# Patient Record
Sex: FEMALE | Race: WHITE | Employment: OTHER | URBAN - METROPOLITAN AREA
[De-identification: names, ages, dates, MRNs, and addresses within clinical notes are randomized per-mention and may not be internally consistent; named-entity substitution may affect disease eponyms.]

---

## 2017-01-31 ENCOUNTER — OFFICE VISIT (OUTPATIENT)
Dept: SURGERY | Age: 72
End: 2017-01-31

## 2017-01-31 VITALS
TEMPERATURE: 96.9 F | BODY MASS INDEX: 43.06 KG/M2 | RESPIRATION RATE: 18 BRPM | HEART RATE: 71 BPM | SYSTOLIC BLOOD PRESSURE: 160 MMHG | DIASTOLIC BLOOD PRESSURE: 78 MMHG | HEIGHT: 62 IN | OXYGEN SATURATION: 94 % | WEIGHT: 234 LBS

## 2017-01-31 DIAGNOSIS — C50.511 MALIGNANT NEOPLASM OF LOWER-OUTER QUADRANT OF RIGHT FEMALE BREAST (HCC): Primary | ICD-10-CM

## 2017-01-31 DIAGNOSIS — J45.20: ICD-10-CM

## 2017-01-31 DIAGNOSIS — Z79.4 TYPE 2 DIABETES MELLITUS WITHOUT COMPLICATION, WITH LONG-TERM CURRENT USE OF INSULIN (HCC): ICD-10-CM

## 2017-01-31 DIAGNOSIS — E11.9 TYPE 2 DIABETES MELLITUS WITHOUT COMPLICATION, WITH LONG-TERM CURRENT USE OF INSULIN (HCC): ICD-10-CM

## 2017-01-31 DIAGNOSIS — I10 HYPERTENSION, ESSENTIAL: ICD-10-CM

## 2017-01-31 RX ORDER — FLUTICASONE PROPIONATE 250 UG/1
POWDER, METERED RESPIRATORY (INHALATION)
COMMUNITY
Start: 2017-01-05

## 2017-01-31 RX ORDER — QUETIAPINE FUMARATE 25 MG/1
TABLET, FILM COATED ORAL
COMMUNITY

## 2017-01-31 NOTE — MR AVS SNAPSHOT
Visit Information Date & Time Provider Department Dept. Phone Encounter #  
 1/31/2017  1:15 PM Jean Guerrero MD [facility] [phone] [id number] Follow-up Instructions Return in about 6 months (around 7/31/2017). Upcoming Health Maintenance Date Due  
 FOOT EXAM Q1 12[birth year]1954 EYE EXAM RETINAL OR DILATED Q1 12/9/1954 DTaP/Tdap/Td series (1 - Tdap) 12/9/1965 FOBT Q 1 YEAR AGE 50-75 12/9/1994 ZOSTER VACCINE AGE 60> 12/9/2004 GLAUCOMA SCREENING Q2Y 12/9/2009 Pneumococcal 65+ High/Highest Risk (1 of 2 - PCV13) 12/9/2009 MEDICARE YEARLY EXAM 12/9/2009 HEMOGLOBIN A1C Q6M 7/18/2010 MICROALBUMIN Q1 1/18/2011 LIPID PANEL Q1 1/18/2011 INFLUENZA AGE 9 TO ADULT 8/1/2016 BREAST CANCER SCRN MAMMOGRAM 6/7/2018 Allergies as of 1/31/2017  Review Complete On: 1/31/2017 By: Jean Guerrero MD  
  
 Severity Noted Reaction Type Reactions Sulfites High 09/27/2016    Cough SULFITES IS IN PLAVIX Current Immunizations  Never Reviewed No immunizations on file. Not reviewed this visit You Were Diagnosed With   
  
 Codes Comments Malignant neoplasm of lower-outer quadrant of right female breast ([address]    -  Primary ICD-10-CM: C50.511 ICD-9-CM: 174.5 Asthma, endogenous, mild intermittent, uncomplicated     QLQ-08-LC: J45.20 ICD-9-CM: 493.10 Hypertension, essential     ICD-10-CM: I10 
ICD-9-CM: 401.9 Type 2 diabetes mellitus without complication, with long-term current use of insulin (HCC)     ICD-10-CM: E11.9, Z79.4 ICD-9-CM: 250.00, V58.67 Vitals BP Pulse Temp Resp Height(growth percentile) Weight(growth percentile) 172/74 (BP 1 Location: Left arm, BP Patient Position: Sitting) 71 96.9 °F (36.1 °C) (Oral) 18 5' 2\" (1.575 m) 234 lb (106.1 kg) SpO2 BMI OB Status Smoking Status 94% 42.8 kg/m2 Postmenopausal Never Smoker BMI and BSA Data Body Mass Index Body Surface Area  
 42.8 kg/m 2 2.15 m 2 Preferred Pharmacy Pharmacy Name Phone 100 Ewelina Hernandez [facility] [phone] Your Updated Medication List  
  
   
This list is accurate as of: 1/31/17  2:35 PM.  Always use your most recent med list.  
  
  
  
  
 albuterol 2.5 mg /3 mL (0.083 %) nebulizer solution Commonly known as:  PROVENTIL VENTOLIN  
by Nebulization route once. AMBIEN 10 mg tablet Generic drug:  zolpidem Take  by mouth nightly as needed for Sleep.  
  
 anastrozole 1 mg tablet Commonly known as:  ARIMIDEX Take 1 Tab by mouth daily. budesonide 180 mcg/actuation Aepb inhaler Commonly known as:  PULMICORT Take  by inhalation. COQ10  100-100 mg-unit Cap Generic drug:  coenzyme q10-vitamin e Take  by mouth. diltiazem hcl 360 mg Tb24 Take  by mouth. * FLONASE 50 mcg/actuation nasal spray Generic drug:  fluticasone  
nightly. * FLOVENT DISKUS 250 mcg/actuation Dsdv Generic drug:  fluticasone JANUVIA 100 mg tablet Generic drug:  SITagliptin Take 100 mg by mouth daily. LANTUS 100 unit/mL injection Generic drug:  insulin glargine  
by SubCUTAneous route nightly. lovastatin 40 mg tablet Commonly known as:  MEVACOR Take 40 mg by mouth nightly. PRANDIN 0.5 mg tablet Generic drug:  repaglinide Take 0.5 mg by mouth Before breakfast, lunch, and dinner. PROPANTHELINE PO Take  by mouth.  
  
 propranolol LA 80 mg SR capsule Commonly known as:  INDERAL LA  
  
 SEROquel 25 mg tablet Generic drug:  QUEtiapine Take  by mouth. SINGULAIR 10 mg tablet Generic drug:  montelukast  
Take 10 mg by mouth daily. valsartan 320 mg tablet Commonly known as:  DIOVAN Take  by mouth daily. VITAMIN D2 50,000 unit capsule Generic drug:  ergocalciferol Take 50,000 Units by mouth. WELLBUTRIN  mg SR tablet Generic drug:  buPROPion SR Take 200 mg by mouth two (2) times a day. ZOLOFT 100 mg tablet Generic drug:  sertraline Take  by mouth daily. * Notice: This list has 2 medication(s) that are the same as other medications prescribed for you. Read the directions carefully, and ask your doctor or other care provider to review them with you. Follow-up Instructions Return in about 6 months (around 7/31/2017). To-Do List   
 06/12/2017 Imaging:  AYESHA 3D CARMELO W MAMMO BI DX INCL CAD Patient Instructions If you have any questions or concerns about today's appointment, the verbal and/or written instructions you were given for follow up care, please call our office at [phone]. [facility] - [place] [phone] office [phone] Introducing [facility] & HEALTH SERVICES! Mary Turner introduces [employer] patient portal. Now you can access parts of your medical record, email your doctor's office, and request medication refills online. 1. In your internet browser, go to [email or web address] 2. Click on the First Time User? Click Here link in the Sign In box. You will see the New Member Sign Up page. 3. Enter your [employer] Access Code exactly as it appears below. You will not need to use this code after youve completed the sign-up process. If you do not sign up before the expiration date, you must request a new code. · [employer] Access Code: [email or web address] Expires: 5/1/2017  2:35 PM 
 
4. Enter the last four digits of your Social Security Number (xxxx) and Date of Birth (mm/dd/yyyy) as indicated and click Submit. You will be taken to the next sign-up page. 5. Create a [employer] ID. This will be your [employer] login ID and cannot be changed, so think of one that is secure and easy to remember. 6. Create a [employer] password. You can change your password at any time. 7. Enter your Password Reset Question and Answer. This can be used at a later time if you forget your password. 8. Enter your e-mail address. You will receive e-mail notification when new information is available in [address] 9. Click Sign Up. You can now view and download portions of your medical record. 10. Click the Download Summary menu link to download a portable copy of your medical information. If you have questions, please visit the Frequently Asked Questions section of the [employer] website. Remember, [employer] is NOT to be used for urgent needs. For medical emergencies, dial 911. Now available from your iPhone and Android! Please provide this summary of care documentation to your next provider. Your primary care clinician is listed as Nakul Sethi. If you have any questions after today's visit, please call [phone].

## 2017-01-31 NOTE — PROGRESS NOTES
Patient is a 66 y.o. female who presents for a follow up breast exam for invasive ductal carcinoma of the right breast with partial mastectomy done on 11/04/2015. Patient denies any new breast changes or areas of concern today. 1. Have you been to the ER, urgent care clinic since your last visit? Hospitalized since your last visit? No    2. Have you seen or consulted any other health care providers outside of the [facility] since your last visit? Include any pap smears or colon screening.  Yes, psychiatrist.

## 2017-01-31 NOTE — PATIENT INSTRUCTIONS
If you have any questions or concerns about today's appointment, the verbal and/or written instructions you were given for follow up care, please call our office at [phone].     [facility] - [address]    [phone] office  [phone]

## 2017-02-01 NOTE — PROGRESS NOTES
Adrianna Anderson comes to the office today for a little over one-year followup from a previous right partial mastectomy for a 1.1 cm invasive ductal carcinoma. The patient did have some lymphovascular invasion but negative sentinel nodes. Her tumor was estrogen and progesterone receptor positive HER-2 negative. She was treated with brachii therapy refused any chemotherapy. She denies any new breast lumps or soreness. She denies any other aches, pains, shortness of breath or headaches. She has not had any edema of her right arm. Allergies   Allergen Reactions    Sulfites Cough     SULFITES IS IN PLAVIX       Current Outpatient Prescriptions   Medication Sig Dispense Refill    QUEtiapine (SEROQUEL) 25 mg tablet Take  by mouth.  FLOVENT DISKUS 250 mcg/actuation dsdv       propranolol LA (INDERAL LA) 80 mg SR capsule       anastrozole (ARIMIDEX) 1 mg tablet Take 1 Tab by mouth daily. 90 Tab 3    ergocalciferol (VITAMIN D2) 50,000 unit capsule Take 50,000 Units by mouth.  sitaGLIPtin (JANUVIA) 100 mg tablet Take 100 mg by mouth daily.  buPROPion SR (WELLBUTRIN SR) 200 mg SR tablet Take 200 mg by mouth two (2) times a day.  sertraline (ZOLOFT) 100 mg tablet Take  by mouth daily.  valsartan (DIOVAN) 320 mg tablet Take  by mouth daily.  diltiazem hcl 360 mg Tb24 Take  by mouth.  coenzyme q10-vitamin e (COQ10 ) 100-100 mg-unit cap Take  by mouth.  PROPANTHELINE BROMIDE (PROPANTHELINE PO) Take  by mouth.  montelukast (SINGULAIR) 10 mg tablet Take 10 mg by mouth daily.  lovastatin (MEVACOR) 40 mg tablet Take 40 mg by mouth nightly.  insulin glargine (LANTUS) 100 unit/mL injection by SubCUTAneous route nightly.  repaglinide (PRANDIN) 0.5 mg tablet Take 0.5 mg by mouth Before breakfast, lunch, and dinner.  albuterol (PROVENTIL VENTOLIN) 2.5 mg /3 mL (0.083 %) nebulizer solution by Nebulization route once.       fluticasone (FLONASE) 50 mcg/actuation nasal spray nightly.  budesonide (PULMICORT) 180 mcg/actuation aepb inhaler Take  by inhalation.  zolpidem (AMBIEN) 10 mg tablet Take  by mouth nightly as needed for Sleep. Past Medical History   Diagnosis Date    Asthma     Depression     Diabetes ([address]     Hypertension     Thromboembolus [facility])        Past Surgical History   Procedure Laterality Date    Hx cholecystectomy      Hx knee replacement Bilateral     Hx cholecystectomy      Hx knee replacement Bilateral     Hx breast lumpectomy Right 11/4/2015     right breast lumpectomy with localization,sentinel lymph node biopsy,possible complete axillary dissection,insertion of charleen spacer  performed by [facility]ad [facility]ilar [facility] at [facility] MAIN OR        Family History   Problem Relation Age of Onset    Alcohol abuse Mother     Hypertension Father     Cancer Father        Social History     Social History    Marital status:      Spouse name: N/A    Number of children: N/A    Years of education: N/A     Occupational History    Not on file. Social History Main Topics    Smoking status: Never Smoker    Smokeless tobacco: Never Used    Alcohol use No    Drug use: No    Sexual activity: Not on file     Other Topics Concern    Not on file     Social History Narrative      The patient's review of systems has not changed. She is continuing to take medication for diabetes and hypertension. She denies any new cardiac, respiratory, gastrointestinal or tension or problems. PHYSICAL EXAM    Visit Vitals    /78 (BP 1 Location: Left arm, BP Patient Position: Sitting)    Pulse 71    Temp 96.9 °F (36.1 °C) (Oral)    Resp 18    Ht 5' 2\" (1.575 m)    Wt 106.1 kg (234 lb)    SpO2 94%    BMI 42.8 kg/m2       Constitutional:  Well-developed, well-nourished, no acute distress. Head:  Head, eyes, ears, nose, throat within normal limits. Skin:  No suspicious moles or rashes. Neck:  No masses or adenopathy.  The airway appears normal. Thyroid is not enlarged and there are no palpable thyroid nodules. Lungs:  Lungs are clear to auscultation and percussion. No respiratory distress. No chest wall tenderness. Heart:  Heart is regular with no extra heart sounds or murmur heard. Breast Exam: The breasts are free of any discrete tenderness or masses. The skin and nipple areas appear normal. Both axillae are negative. She has well-healed scars from previous surgery without any evidence of recurrent disease. Abdomen: The abdomen is soft and nontender without organomegaly or masses. Bowel sounds are active and of normal  pitch. There is no abdominal distention. No hernias are evident. Extremities:  No tenderness of the extremities and no significant swelling. Psych:  Alert and oriented. ASSESSMENT:#11.1 cm invasive ductal carcinoma of the right breast treated with partial mastectomy a little over one year ago 11/4/15. There was lymphovascular invasion but negative sentinel nodes. The tumor was estrogen and progesterone she positive. She was treated with subsequent brachytherapy and is now on her in the next without evidence of recurrent disease. #2 diabetes. #3 hypertension. #4 hypercholesterolemia. RECOMMENDATIONS:we will plan to see the patient back in about 6 months which will be just after her next mammogram.    Naomy Carson MD  Please note: This document has been produced using voice recognition software. Unrecognized errors in transcription may be present.

## 2017-06-14 ENCOUNTER — HOSPITAL ENCOUNTER (OUTPATIENT)
Dept: MAMMOGRAPHY | Age: 72
Discharge: HOME | End: 2017-06-14
Attending: SPECIALIST
Payer: MEDICARE

## 2017-06-14 DIAGNOSIS — C50.511 MALIGNANT NEOPLASM OF LOWER-OUTER QUADRANT OF RIGHT FEMALE BREAST (HCC): ICD-10-CM

## 2017-06-14 PROCEDURE — 77062 BREAST TOMOSYNTHESIS BI: CPT

## 2017-07-11 ENCOUNTER — OFFICE VISIT (OUTPATIENT)
Dept: SURGERY | Age: 72
End: 2017-07-11

## 2017-07-11 VITALS
BODY MASS INDEX: 41.77 KG/M2 | HEART RATE: 65 BPM | OXYGEN SATURATION: 94 % | TEMPERATURE: 99.4 F | SYSTOLIC BLOOD PRESSURE: 111 MMHG | WEIGHT: 227 LBS | DIASTOLIC BLOOD PRESSURE: 51 MMHG | RESPIRATION RATE: 12 BRPM | HEIGHT: 62 IN

## 2017-07-11 DIAGNOSIS — Z79.4 TYPE 2 DIABETES MELLITUS WITHOUT COMPLICATION, WITH LONG-TERM CURRENT USE OF INSULIN (HCC): ICD-10-CM

## 2017-07-11 DIAGNOSIS — I10 HYPERTENSION, ESSENTIAL: ICD-10-CM

## 2017-07-11 DIAGNOSIS — J45.909 ASTHMA, ENDOGENOUS: ICD-10-CM

## 2017-07-11 DIAGNOSIS — E11.9 TYPE 2 DIABETES MELLITUS WITHOUT COMPLICATION, WITH LONG-TERM CURRENT USE OF INSULIN (HCC): ICD-10-CM

## 2017-07-11 DIAGNOSIS — C50.511 MALIGNANT NEOPLASM OF LOWER-OUTER QUADRANT OF RIGHT FEMALE BREAST (HCC): Primary | ICD-10-CM

## 2017-07-11 RX ORDER — HYDROCHLOROTHIAZIDE 25 MG/1
25 TABLET ORAL DAILY
COMMUNITY

## 2017-07-11 NOTE — PROGRESS NOTES
Ms. Polly Antonio is a 66year old female who presents today for a 6 month follow up from 1/31/17 office visit for a ana partial right mastectomy for invasive ductal carcinoma on 11/10/2015. She reports that her Right breast has been sagging more. Denies any other breast changes. 1. Have you been to the ER, urgent care clinic since your last visit? Hospitalized since your last visit? No    2. Have you seen or consulted any other health care providers outside of the [address] [place] [address] since your last visit? Include any pap smears or colon screening.  No    Started Hydrochlorothiazide 25mg qday

## 2017-07-11 NOTE — MR AVS SNAPSHOT
Visit Information Date & Time Provider Department Dept. Phone Encounter #  
 7/11/2017  1:15 PM Yudelka Alonzo MD [facility] [phone] [id number] Follow-up Instructions Return in about 6 months (around 1/11/2018). Upcoming Health Maintenance Date Due  
 FOOT EXAM Q1 12/9/1955 EYE EXAM RETINAL OR DILATED Q1 12/9/1954 DTaP/Tdap/Td series (1 - Tdap) 12/9/1965 FOBT Q 1 YEAR AGE 50-75 12/9/1994 ZOSTER VACCINE AGE 60> 12/9/2004 GLAUCOMA SCREENING Q2Y 12/9/2009 Pneumococcal 65+ High/Highest Risk (1 of 2 - PCV13) 12/9/2009 MEDICARE YEARLY EXAM 12/9/2009 HEMOGLOBIN A1C Q6M 7/18/2010 MICROALBUMIN Q1 1/18/2011 LIPID PANEL Q1 1/18/2011 INFLUENZA AGE 9 TO ADULT 8/1/2017 BREAST CANCER SCRN MAMMOGRAM 6/14/2019 Allergies as of 7/11/2017  Review Complete On: 7/11/2017 By: Yudelka Alonzo MD  
  
 Severity Noted Reaction Type Reactions Sulfites High 09/27/2016    Cough SULFITES IS IN PLAVIX Current Immunizations  Never Reviewed No immunizations on file. Not reviewed this visit You Were Diagnosed With   
  
 Codes Comments Malignant neoplasm of lower-outer quadrant of right female breast ([facility]    -  Primary ICD-10-CM: C50.511 ICD-9-CM: 174.5 Asthma, endogenous     ICD-10-CM: J45.909 ICD-9-CM: 493.10 Hypertension, essential     ICD-10-CM: I10 
ICD-9-CM: 401.9 Type 2 diabetes mellitus without complication, with long-term current use of insulin (HCC)     ICD-10-CM: E11.9, Z79.4 ICD-9-CM: 250.00, V58.67 Vitals BP Pulse Temp Resp Height(growth percentile) Weight(growth percentile) 111/51 (BP 1 Location: Right arm, BP Patient Position: Sitting) 65 99.4 °F (37.4 °C) (Oral) 12 5' 2\" (1.575 m) 227 lb (103 kg) SpO2 PF BMI OB Status Smoking Status 94% 94 L/min 41.52 kg/m2 Postmenopausal Never Smoker BMI and BSA Data Body Mass Index Body Surface Area 41.52 kg/m 2 2.12 m 2 Preferred Pharmacy Pharmacy Name Phone 100 Tracie Rosas [facility] [phone] Your Updated Medication List  
  
   
This list is accurate as of: 7/11/17  2:05 PM.  Always use your most recent med list.  
  
  
  
  
 albuterol 2.5 mg /3 mL (0.083 %) nebulizer solution Commonly known as:  PROVENTIL VENTOLIN  
by Nebulization route once. AMBIEN 10 mg tablet Generic drug:  zolpidem Take  by mouth nightly as needed for Sleep.  
  
 anastrozole 1 mg tablet Commonly known as:  ARIMIDEX Take 1 Tab by mouth daily. budesonide 180 mcg/actuation Aepb inhaler Commonly known as:  PULMICORT Take  by inhalation. COQ10  100-100 mg-unit Cap Generic drug:  coenzyme q10-vitamin e Take  by mouth. dilTIAZem  mg Tb24 tablet Commonly known as:  CARDIZEM LA Take  by mouth. * FLONASE 50 mcg/actuation nasal spray Generic drug:  fluticasone  
nightly. * FLOVENT DISKUS 250 mcg/actuation Dsdv Generic drug:  fluticasone  
  
 hydroCHLOROthiazide 25 mg tablet Commonly known as:  HYDRODIURIL Take 25 mg by mouth daily. JANUVIA 100 mg tablet Generic drug:  SITagliptin Take 100 mg by mouth daily. LANTUS 100 unit/mL injection Generic drug:  insulin glargine 12 Units by SubCUTAneous route nightly. lovastatin 40 mg tablet Commonly known as:  MEVACOR Take 40 mg by mouth nightly. PRANDIN 0.5 mg tablet Generic drug:  repaglinide Take 0.5 mg by mouth Before breakfast, lunch, and dinner. PROPANTHELINE PO Take  by mouth.  
  
 propranolol LA 80 mg SR capsule Commonly known as:  INDERAL LA  
  
 SEROquel 25 mg tablet Generic drug:  QUEtiapine Take  by mouth. SINGULAIR 10 mg tablet Generic drug:  montelukast  
Take 10 mg by mouth daily. valsartan 320 mg tablet Commonly known as:  DIOVAN Take  by mouth daily. VITAMIN D2 50,000 unit capsule Generic drug:  ergocalciferol Take 50,000 Units by mouth. WELLBUTRIN  mg SR tablet Generic drug:  buPROPion SR Take 200 mg by mouth two (2) times a day. ZOLOFT 100 mg tablet Generic drug:  sertraline Take  by mouth daily. * Notice: This list has 2 medication(s) that are the same as other medications prescribed for you. Read the directions carefully, and ask your doctor or other care provider to review them with you. Follow-up Instructions Return in about 6 months (around 1/11/2018). Introducing [facility] & HEALTH SERVICES! Isabel Rjoas introduces [employer] patient portal. Now you can access parts of your medical record, email your doctor's office, and request medication refills online. 1. In your internet browser, go to [email or web address]. [email or web address] 2. Click on the First Time User? Click Here link in the Sign In box. You will see the New Member Sign Up page. 3. Enter your new [employer] company Access Code exactly as it appears below. You will not need to use this code after youve completed the sign-up process. If you do not sign up before the expiration date, you must request a new code. · new [employer] company Access Code: [email or web address] Expires: 9/10/2017  4:07 PM 
 
4. Enter the last four digits of your Social Security Number (xxxx) and Date of Birth (mm/dd/yyyy) as indicated and click Submit. You will be taken to the next sign-up page. 5. Create a new test company ID. This will be your new test company login ID and cannot be changed, so think of one that is secure and easy to remember. 6. Create a new test company password. You can change your password at any time. 7. Enter your Password Reset Question and Answer. This can be used at a later time if you forget your password. 8. Enter your e-mail address. You will receive e-mail notification when new information is available in [address] 9. Click Sign Up.  You can now view and download portions of your medical record. 10. Click the Download Summary menu link to download a portable copy of your medical information. If you have questions, please visit the Frequently Asked Questions section of the [employer] website. Remember, [employer] is NOT to be used for urgent needs. For medical emergencies, dial 911. Now available from your iPhone and Android! Please provide this summary of care documentation to your next provider. Your primary care clinician is listed as [facility]. If you have any questions after today's visit, please call [phone].

## 2017-07-11 NOTE — PROGRESS NOTES
Janis Hou comes to the office today for a 1-1/2 year follow-up from a previous right partial mastectomy for a 1.1 cm invasive ductal carcinoma. She did have some lymphovascular invasion but negative sentinel nodes. The tumor was estrogen and progestin receptor positive but HER-2 negative. Subsequently she was treated with brachii therapy but refused any chemotherapy. She is tolerating Arimidex. In June of this year she underwent a 3D mammogram which was negative. The patient denies any new breast lumps or soreness. She denies any edema of her right arm. She denies any other new aches, pains, shortness of breath or headaches. Allergies   Allergen Reactions    Sulfites Cough     SULFITES IS IN PLAVIX     Past Medical History:   Diagnosis Date    Asthma     Breast cancer ([facility] 2015    Right lumpectomy    Breast cyst     RT - removal    Depression     Diabetes ([facility]     Hypertension     [facility]      Past Surgical History:   Procedure Laterality Date    HX BREAST LUMPECTOMY Right 11/4/2015    right breast lumpectomy with localization,sentinel lymph node biopsy,possible complete axillary dissection,insertion of charleen spacer  performed by Dedra Leija MD at [facility] HX CHOLECYSTECTOMY      HX CHOLECYSTECTOMY      HX KNEE REPLACEMENT Bilateral     HX KNEE REPLACEMENT Bilateral      Social History     Social History    Marital status:      Spouse name: N/A    Number of children: N/A    Years of education: N/A     Social History Main Topics    Smoking status: Never Smoker    Smokeless tobacco: Never Used    Alcohol use No    Drug use: No    Sexual activity: Not Asked     Other Topics Concern    None     Social History Narrative     The patient's review of systems has not changed. She continues to be on medication for hypertension and hypercholesterolemia. She denies any new cardiac, respiratory, gastrointestinal or genitourinary problems.   She had developed some slight edema of her legs but has started taking HCTZ which has alleviated that problem. PHYSICAL EXAM    Visit Vitals    /51 (BP 1 Location: Right arm, BP Patient Position: Sitting)    Pulse 65    Temp 99.4 °F (37.4 °C) (Oral)    Resp 12    Ht 5' 2\" (1.575 m)    Wt 103 kg (227 lb)    SpO2 94%    PF 94 L/min    BMI 41.52 kg/m2        Constitutional:  Well-developed, well-nourished, no acute distress. Head:  Head, eyes, ears, nose, throat within normal limits. Skin:  No suspicious moles or rashes. Neck:  No masses or adenopathy. The airway appears normal. Thyroid is not enlarged and there are no palpable thyroid nodules. Lungs:  Lungs are clear to auscultation and percussion. No respiratory distress. No chest wall tenderness. Heart:  Heart is regular with no extra heart sounds or murmur heard. Breast Exam: The breasts are free of any discrete tenderness or masses  The skin and nipple areas appear normal. Both axillae are negative. Patient's right breast shows a medial scar and about the 3 o'clock position. But also a transverse curvilinear scar in about the 8 o'clock position a few centimeters away from the areola. This is the area of the most recent surgery. A Brielle device had been implanted for brachii therapy and later removed and one can still just feel scarring from this at the edge of the incision laterally. No evidence of any other recurrent masses or skin changes to suggest recurrence. Abdomen: The abdomen is soft and nontender without organomegaly or masses. Bowel sounds are active and of normal pitch. There is no abdominal distention. No hernias are evident. Extremities:  No tenderness of the extremities and no significant swelling. Psych:  Alert and oriented. Assessment: 1.1 cm invasive ductal carcinoma of the right breast treated with partial mastectomy 1-1/2 years ago. [place] nodes were negative.   Tumor was estrogen and progestin receptor positive but HER-2 negative. She was treated with brachii therapy and is now on Arimidex. No evidence of recurrent disease. Recommendations: The patient's encouraged to do self breast examination. I will plan to see her back in about 6 months. The above was dictated with Pieter. There may be unrecognized errors.     Eddie Sam MD

## 2017-08-20 DIAGNOSIS — C50.919 MALIGNANT NEOPLASM OF FEMALE BREAST, UNSPECIFIED LATERALITY, UNSPECIFIED SITE OF BREAST: ICD-10-CM

## 2017-08-24 RX ORDER — ANASTROZOLE 1 MG/1
TABLET ORAL
Qty: 90 TAB | Refills: 2 | Status: SHIPPED | OUTPATIENT
Start: 2017-08-24 | End: 2018-01-23 | Stop reason: SDUPTHER

## 2018-01-23 ENCOUNTER — OFFICE VISIT (OUTPATIENT)
Dept: SURGERY | Age: 73
End: 2018-01-23

## 2018-01-23 VITALS
BODY MASS INDEX: 43.69 KG/M2 | TEMPERATURE: 98.3 F | WEIGHT: 237.4 LBS | HEIGHT: 62 IN | OXYGEN SATURATION: 96 % | RESPIRATION RATE: 18 BRPM | DIASTOLIC BLOOD PRESSURE: 57 MMHG | SYSTOLIC BLOOD PRESSURE: 138 MMHG | HEART RATE: 71 BPM

## 2018-01-23 DIAGNOSIS — Z17.0 MALIGNANT NEOPLASM OF LOWER-OUTER QUADRANT OF RIGHT BREAST OF FEMALE, ESTROGEN RECEPTOR POSITIVE (HCC): Primary | ICD-10-CM

## 2018-01-23 DIAGNOSIS — I10 HYPERTENSION, ESSENTIAL: ICD-10-CM

## 2018-01-23 DIAGNOSIS — E11.9 TYPE 2 DIABETES MELLITUS WITHOUT COMPLICATION, WITH LONG-TERM CURRENT USE OF INSULIN (HCC): ICD-10-CM

## 2018-01-23 DIAGNOSIS — C50.511 MALIGNANT NEOPLASM OF LOWER-OUTER QUADRANT OF RIGHT BREAST OF FEMALE, ESTROGEN RECEPTOR POSITIVE (HCC): Primary | ICD-10-CM

## 2018-01-23 DIAGNOSIS — Z79.4 TYPE 2 DIABETES MELLITUS WITHOUT COMPLICATION, WITH LONG-TERM CURRENT USE OF INSULIN (HCC): ICD-10-CM

## 2018-01-23 PROBLEM — E66.01 OBESITY, MORBID (HCC): Status: ACTIVE | Noted: 2018-01-23

## 2018-01-23 RX ORDER — ANASTROZOLE 1 MG/1
1 TABLET ORAL DAILY
Qty: 90 TAB | Refills: 3 | Status: SHIPPED | OUTPATIENT
Start: 2018-01-23 | End: 2019-04-01 | Stop reason: SDUPTHER

## 2018-01-23 RX ORDER — ANASTROZOLE 1 MG/1
TABLET ORAL
Qty: 90 TAB | Refills: 2 | Status: SHIPPED | OUTPATIENT
Start: 2018-01-23 | End: 2020-09-15 | Stop reason: SDUPTHER

## 2018-01-23 NOTE — PROGRESS NOTES
Marion Otero comes to the office today for her 2 year follow-up. The patient had a previous right breast lumpectomy for a 1.1 cm invasive ductal carcinoma. She did have some lymphovascular invasion but negative sentinel nodes. The tumor was estrogen and progestin receptor positive but HER-2 negative. She underwent brachii therapy for radiation treatment and has been on Arimidex for almost 2 years. The patient has not been aware of any new breast lumps. Now and then she will have a little bit of breast pain. She denies any edema in her arm or any other new aches, pains, shortness of breath or headaches. The patient's last mammogram was about 6 months ago. Allergies   Allergen Reactions    Sulfites Cough     SULFITES IS IN PLAVIX     Past Medical History:   Diagnosis Date    Asthma     Breast cancer ([facility] 2015    Right lumpectomy    Breast cyst     RT - removal    Depression     Diabetes ([facility]     Hypertension     Thromboembolus ([facility]      Past Surgical History:   Procedure Laterality Date    HX BREAST LUMPECTOMY Right 11/4/2015    right breast lumpectomy with localization,sentinel lymph node biopsy,possible complete axillary dissection,insertion of charleen spacer  [facility] [address] [facility] [address] HX CHOLECYSTECTOMY      HX CHOLECYSTECTOMY      HX KNEE REPLACEMENT Bilateral     HX KNEE REPLACEMENT Bilateral      Social History     Social History    Marital status:      Spouse name: N/A    Number of children: N/A    Years of education: N/A     Social History Main Topics    Smoking status: Never Smoker    Smokeless tobacco: Never Used    Alcohol use No    Drug use: No    Sexual activity: Not Asked     Other Topics Concern    None     Social History Narrative     Overall the patient states that her review of systems has not changed. She continues to take multiple medications including medication for diabetes and hypertension.   She denies any new cardiac, respiratory, gastrointestinal or genitourinary problems. PHYSICAL EXAM    Visit Vitals    /57 (BP 1 Location: Left arm, BP Patient Position: Sitting)    Pulse 71    Temp 98.3 °F (36.8 °C) (Oral)    Resp 18    Ht 5' 2\" (1.575 m)    Wt 107.7 kg (237 lb 6.4 oz)    SpO2 96%    BMI 43.42 kg/m2          Constitutional:  Well-developed, well-nourished, no acute distress. Patient has changed her hair color. She is in very good spirits today. Head:  Head, eyes, ears, nose, throat within normal limits. Skin:  No suspicious moles or rashes. Neck:  No masses or adenopathy. The airway appears normal. Thyroid is not enlarged and there are no palpable thyroid nodules. Lungs:  Lungs are clear to auscultation and percussion. No respiratory distress. No chest wall tenderness. Heart:  Heart is regular with no extra heart sounds or murmur heard. Breast Exam: The breasts are free of any discrete tenderness or masses  The skin and nipple areas appear normal. Both axillae are negative. The patient has very well-healed incisions in the right axilla from the previous sentinel node biopsy and in the right breast medially. Abdomen: The abdomen is soft and nontender without organomegaly or masses. Bowel sounds are active and of normal pitch. There is no abdominal distention. No hernias are evident. Extremities:  No tenderness of the extremities and no significant swelling. Psych:  Alert and oriented. Assessment #1: Invasive ductal carcinoma right breast measuring 1.1 cm removed 2 years ago. There was lymphovascular invasion but negative sentinel nodes. The tumor was estrogen and progestin receptor positive but HER-2 negative. No evidence of recurrent disease. Tolerating Arimidex. #2 diabetes. #3 hypertension. #4 asthma    Recommendations: Patient was encouraged to stay on the Arimidex for at least a total of 5 years. She was encouraged to do self breast examinations monthly. I will plan to see her back in about 6 months and she will be due for her mammogram before that visit. The above was dictated with Pieter. There may be unrecognized errors.     Theoplis Aschoff MD

## 2018-01-23 NOTE — PATIENT INSTRUCTIONS
If you have any questions or concerns about today's appointment, the verbal and/or written instructions you were given for follow up care, please call our office at [phone].     [facility] - [address]    [phone] office  [phone] fax

## 2018-01-23 NOTE — MR AVS SNAPSHOT
[facility] 
 
 
 [address] [facility] [address] [address] 
[phone] Patient: Luis Worthy MRN: [id number] :1945 Visit Information Date & Time Provider Department Dept. Phone Encounter #  
 2018  2:45 PM MD Ly [facility] [phone] [id number] Follow-up Instructions Return in about 6 months (around 2018). Your Appointments 2018  1:00 PM  
Follow Up with Aleena Pak MD  
[address]) Appt Note: 6 mth f/up [address] [facility] [address] [facility]  
  
   
 [id number] [place] [address] [address] Upcoming Health Maintenance Date Due  
 FOOT EXAM Q1 1955 EYE EXAM RETINAL OR DILATED Q1 1954 DTaP/Tdap/Td series (1 - Tdap) 1965 FOBT Q 1 YEAR AGE 50-75 1994 ZOSTER VACCINE AGE 60> 10/9/2004 GLAUCOMA SCREENING Q2Y 2009 Pneumococcal 65+ High/Highest Risk (1 of 2 - PCV13) 2009 MEDICARE YEARLY EXAM 2009 HEMOGLOBIN A1C Q6M 2010 MICROALBUMIN Q1 2011 LIPID PANEL Q1 2011 Influenza Age 5 to Adult 2017 BREAST CANCER SCRN MAMMOGRAM 2019 Allergies as of 2018  Review Complete On: 2018 By: Aleena Pak MD  
  
 Severity Noted Reaction Type Reactions Sulfites High 2016    Cough SULFITES IS IN PLAVIX Current Immunizations  Never Reviewed No immunizations on file. Not reviewed this visit You Were Diagnosed With   
  
 Codes Comments Malignant neoplasm of lower-outer quadrant of right breast of female, estrogen receptor positive ([facility]    -  Primary ICD-10-CM: C50.511, Z17.0 ICD-9-CM: 174.5, V86.0 Hypertension, essential     ICD-10-CM: I10 
ICD-9-CM: 401.9  Type 2 diabetes mellitus without complication, with long-term current use of insulin ([facility]     ICD-10-CM: E11.9, Z79.4 ICD-9-CM: 250.00, V58.67 Vitals BP Pulse Temp Resp Height(growth percentile) Weight(growth percentile) 138/57 (BP 1 Location: Left arm, BP Patient Position: Sitting) 71 98.3 °F (36.8 °C) (Oral) 18 5' 2\" (1.575 m) 237 lb 6.4 oz (107.7 kg) SpO2 BMI OB Status Smoking Status 96% 43.42 kg/m2 Postmenopausal Never Smoker BMI and BSA Data Body Mass Index Body Surface Area  
 43.42 kg/m 2 2.17 m 2 Preferred Pharmacy Pharmacy Name Phone [address] [facility] [phone] Your Updated Medication List  
  
   
This list is accurate as of: 1/23/18  4:22 PM.  Always use your most recent med list.  
  
  
  
  
 albuterol 2.5 mg /3 mL (0.083 %) nebulizer solution Commonly known as:  PROVENTIL VENTOLIN  
by Nebulization route once. AMBIEN 10 mg tablet Generic drug:  zolpidem Take  by mouth nightly as needed for Sleep. * anastrozole 1 mg tablet Commonly known as:  ARIMIDEX Take 1 Tab by mouth daily. * anastrozole 1 mg tablet Commonly known as:  ARIMIDEX TAKE 1 TABLET DAILY  
  
 budesonide 180 mcg/actuation Aepb inhaler Commonly known as:  PULMICORT Take  by inhalation. COQ10  100-100 mg-unit Cap Generic drug:  coenzyme q10-vitamin e Take  by mouth. dilTIAZem  mg Tb24 tablet Commonly known as:  CARDIZEM LA Take  by mouth. * FLONASE 50 mcg/actuation nasal spray Generic drug:  fluticasone  
nightly. * FLOVENT DISKUS 250 mcg/actuation Dsdv Generic drug:  fluticasone  
  
 hydroCHLOROthiazide 25 mg tablet Commonly known as:  HYDRODIURIL Take 25 mg by mouth daily. JANUVIA 100 mg tablet Generic drug:  SITagliptin Take 100 mg by mouth daily. LANTUS 100 unit/mL injection Generic drug:  insulin glargine 12 Units by SubCUTAneous route nightly. lovastatin 40 mg tablet Commonly known as:  MEVACOR Take 40 mg by mouth nightly. PRANDIN 0.5 mg tablet Generic drug:  repaglinide Take 0.5 mg by mouth Before breakfast, lunch, and dinner. PROPANTHELINE PO Take  by mouth.  
  
 propranolol LA 80 mg SR capsule Commonly known as:  INDERAL LA  
  
 SEROquel 25 mg tablet Generic drug:  QUEtiapine Take  by mouth. SINGULAIR 10 mg tablet Generic drug:  montelukast  
Take 10 mg by mouth daily. valsartan 320 mg tablet Commonly known as:  DIOVAN Take  by mouth daily. VITAMIN D2 50,000 unit capsule Generic drug:  ergocalciferol Take 50,000 Units by mouth. WELLBUTRIN  mg SR tablet Generic drug:  buPROPion SR Take 200 mg by mouth two (2) times a day. ZOLOFT 100 mg tablet Generic drug:  sertraline Take  by mouth daily. * Notice: This list has 4 medication(s) that are the same as other medications prescribed for you. Read the directions carefully, and ask your doctor or other care provider to review them with you. Prescriptions Sent to Pharmacy Refills  
 anastrozole (ARIMIDEX) 1 mg tablet 3 Sig: Take 1 Tab by mouth daily. Class: Normal  
 Pharmacy: [address], [address] Ph #: [phone] Route: Oral  
 anastrozole (ARIMIDEX) 1 mg tablet 2 Sig: TAKE 1 TABLET DAILY Class: Normal  
 Pharmacy: [address], [address] Ph #: [phone] Follow-up Instructions Return in about 6 months (around 7/23/2018). To-Do List   
 06/23/2018 Imaging:  [facility] 3D CARMELO W MAMMO BI DX INCL CAD Patient Instructions If you have any questions or concerns about today's appointment, the verbal and/or written instructions you were given for follow up care, please call our office at [phone]. [facility] - [facility] [address] [place] [address], [address] [place] [address] [phone] office [phone] fax Introducing [facility]! Stephy Rich introduces [facility] patient portal. Now you can access parts of your medical record, email your doctor's office, and request medication refills online. 1. In your internet browser, go to [email or web address] 2. Click on the First Time User? Click Here link in the Sign In box. You will see the New Member Sign Up page. 3. Enter your [facility] Access Code exactly as it appears below. You will not need to use this code after youve completed the sign-up process. If you do not sign up before the expiration date, you must request a new code. · [facility] Access Code: [email or web address] Expires: 4/23/2018  2:40 PM 
 
4. Enter the last four digits of your Social Security Number (xxxx) and Date of Birth (mm/dd/yyyy) as indicated and click Submit. You will be taken to the next sign-up page. 5. Create a [facility] ID. This will be your [facility] login ID and cannot be changed, so think of one that is secure and easy to remember. 6. Create a [facility] password. You can change your password at any time. 7. Enter your Password Reset Question and Answer. This can be used at a later time if you forget your password. 8. Enter your e-mail address. You will receive e-mail notification when new information is available in [address] 9. Click Sign Up. You can now view and download portions of your medical record. 10. Click the Download Summary menu link to download a portable copy of your medical information. If you have questions, please visit the Frequently Asked Questions section of the [facility] website. Remember, [facility] is NOT to be used for urgent needs. For medical emergencies, dial 911. Now available from your iPhone and Android! Please provide this summary of care documentation to your next provider. Your primary care clinician is listed as Baldomero Fleming.  If you have any questions after today's visit, please call [phone].

## 2018-01-23 NOTE — PROGRESS NOTES
Homer Lynne is a 67 y.o. female who presents today for a follow up breast exam for a previous right breast partial mastectomy done for invasive ductal carcinoma on 11/04/2015. She denies any new breast changes or areas of concern today. 1. Have you been to the ER, urgent care clinic since your last visit? Hospitalized since your last visit? No    2. Have you seen or consulted any other health care providers outside of the [address] [place] [address] since your last visit? Include any pap smears or colon screening.  No

## 2018-06-18 ENCOUNTER — HOSPITAL ENCOUNTER (OUTPATIENT)
Dept: MAMMOGRAPHY | Age: 73
Discharge: HOME | End: 2018-06-18
Attending: SPECIALIST
Payer: MEDICARE

## 2018-06-18 DIAGNOSIS — Z12.31 VISIT FOR SCREENING MAMMOGRAM: ICD-10-CM

## 2018-06-18 DIAGNOSIS — C50.511 MALIGNANT NEOPLASM OF LOWER-OUTER QUADRANT OF RIGHT BREAST OF FEMALE, ESTROGEN RECEPTOR POSITIVE (HCC): ICD-10-CM

## 2018-06-18 DIAGNOSIS — Z17.0 MALIGNANT NEOPLASM OF LOWER-OUTER QUADRANT OF RIGHT BREAST OF FEMALE, ESTROGEN RECEPTOR POSITIVE (HCC): ICD-10-CM

## 2018-06-18 PROCEDURE — 77062 BREAST TOMOSYNTHESIS BI: CPT

## 2018-06-26 ENCOUNTER — OFFICE VISIT (OUTPATIENT)
Dept: SURGERY | Age: 73
End: 2018-06-26

## 2018-06-26 VITALS
HEART RATE: 73 BPM | WEIGHT: 235.4 LBS | TEMPERATURE: 99.6 F | SYSTOLIC BLOOD PRESSURE: 150 MMHG | OXYGEN SATURATION: 95 % | RESPIRATION RATE: 18 BRPM | DIASTOLIC BLOOD PRESSURE: 72 MMHG | BODY MASS INDEX: 43.32 KG/M2 | HEIGHT: 62 IN

## 2018-06-26 DIAGNOSIS — J45.909 ASTHMA, ENDOGENOUS: ICD-10-CM

## 2018-06-26 DIAGNOSIS — E66.01 OBESITY, MORBID (HCC): ICD-10-CM

## 2018-06-26 DIAGNOSIS — E11.9 TYPE 2 DIABETES MELLITUS WITHOUT COMPLICATION, WITH LONG-TERM CURRENT USE OF INSULIN (HCC): ICD-10-CM

## 2018-06-26 DIAGNOSIS — Z79.4 TYPE 2 DIABETES MELLITUS WITHOUT COMPLICATION, WITH LONG-TERM CURRENT USE OF INSULIN (HCC): ICD-10-CM

## 2018-06-26 DIAGNOSIS — Z17.0 MALIGNANT NEOPLASM OF LOWER-OUTER QUADRANT OF RIGHT BREAST OF FEMALE, ESTROGEN RECEPTOR POSITIVE (HCC): Primary | ICD-10-CM

## 2018-06-26 DIAGNOSIS — C50.511 MALIGNANT NEOPLASM OF LOWER-OUTER QUADRANT OF RIGHT BREAST OF FEMALE, ESTROGEN RECEPTOR POSITIVE (HCC): Primary | ICD-10-CM

## 2018-06-26 DIAGNOSIS — I10 HYPERTENSION, ESSENTIAL: ICD-10-CM

## 2018-06-26 RX ORDER — RANITIDINE 150 MG/1
150 TABLET, FILM COATED ORAL 2 TIMES DAILY
COMMUNITY

## 2018-06-26 NOTE — PATIENT INSTRUCTIONS
If you have any questions or concerns about today's appointment, the verbal and/or written instructions you were given for follow up care, please call our office at [phone].     [facility] - [address]    [phone] office  [phone]

## 2018-06-26 NOTE — PROGRESS NOTES
Chief Complaint   Patient presents with    Follow-up     Follow up breast exam for previous right breast lumpectomy for previous invasive ductal carcinoma 11.4. 15. Last mammo 6.18.18. No complaints. 1. Have you been to the ER, urgent care clinic since your last visit? Hospitalized since your last visit? No    2. Have you seen or consulted any other health care providers outside of the [address] [place] [address] since your last visit? Include any pap smears or colon screening.  No

## 2018-06-26 NOTE — PROGRESS NOTES
Betty Kaminski comes back to the office today for her 2-1/2 year follow-up from previous right breast lumpectomy for a 1.1 cm invasive ductal carcinoma. She had negative sentinel nodes. The tumor was estrogen receptor positive and HER-2 negative. Subsequently she was treated with brachii therapy and has been on Arimidex. The patient's been doing well and had a 3D mammogram just a few days ago which was negative. She has not been aware of any lumps or changes in her breast.  She denies any edema of her right arm. She denies any other new aches, pains, shortness of breath or headaches. Allergies   Allergen Reactions    Sulfites Cough     SULFITES IS IN PLAVIX     Past Medical History:   Diagnosis Date    Asthma     Breast cancer ([facility] 2015    Right lumpectomy    Breast cyst     RT - removal    Depression     Diabetes ([facility]     Hypertension     [facility]      Past Surgical History:   Procedure Laterality Date    HX BREAST LUMPECTOMY Right 11/4/2015    right breast lumpectomy with localization,sentinel lymph node biopsy,possible complete axillary dissection,insertion of charleen spacer  performed by José Avila MD at [address] HX CHOLECYSTECTOMY      HX CHOLECYSTECTOMY      HX KNEE REPLACEMENT Bilateral     HX KNEE REPLACEMENT Bilateral      Social History     Social History    Marital status:      Spouse name: N/A    Number of children: N/A    Years of education: N/A     Social History Main Topics    Smoking status: Never Smoker    Smokeless tobacco: Never Used    Alcohol use No    Drug use: No    Sexual activity: Not Asked     Other Topics Concern    None     Social History Narrative     The patient's review of systems has not changed significantly. She continues to be on multiple medications including for asthma, hypertension, diabetes, hypercholesterolemia, GERD and depression. She states that her blood pressure and diabetes have been fairly stable.   She denies any new cardiac, respiratory, gastrointestinal or genitourinary problems. PHYSICAL EXAM    Visit Vitals    /72 (BP 1 Location: Right arm, BP Patient Position: Sitting)    Pulse 73    Temp 99.6 °F (37.6 °C) (Oral)    Resp 18    Ht 5' 2\" (1.575 m)    Wt 106.8 kg (235 lb 6.4 oz)    SpO2 95%    BMI 43.06 kg/m2          Constitutional:  Well-developed, well-nourished, no acute distress. Head:  Head, eyes, ears, nose, throat within normal limits. Skin:  No suspicious moles or rashes. Neck:  No masses or adenopathy. The airway appears normal. Thyroid is not enlarged and there are no palpable thyroid nodules. Lungs:  Lungs are clear to auscultation and percussion. No respiratory distress. No chest wall tenderness. Heart:  Heart is regular with no extra heart sounds or murmur heard. Breast Exam: The breasts are free of any discrete tenderness or masses  The skin and nipple areas appear normal. Both axillae are negative. The patient has a very faint scar that is transverse and about the 9 o'clock position near the edge of the areola with some slight thickening around there. She has a small axillary incision is well-healed. No evidence of recurrent disease. Abdomen: The abdomen is soft and nontender without organomegaly or masses. Bowel sounds are active and of normal pitch. There is no abdominal distention. No hernias are evident. Extremities:  No tenderness of the extremities and no significant swelling. Psych:  Alert and oriented. Assessment invasive ductal 1.1 cm carcinoma of the right breast with negative sentinel nodes and positive ER receptor and negative HER-2. Patient presently on Arimidex and tolerating it well with no evidence of recurrent disease. #2 diabetes. #3 hypertension. #4 asthma. #5 GERD. #6 depression    Recommendations: I recommend that the patient continue to be seen in 6 months.   Patient wants to continue with this practice so I told her that I would be leaving and she will be seen Dr. Chino Seay in about 6 months. The above was dictated with Pieter. There may be unrecognized errors.     Ramone Wagoner MD

## 2018-08-14 ENCOUNTER — HOSPITAL ENCOUNTER (OUTPATIENT)
Dept: GENERAL RADIOLOGY | Age: 73
Discharge: HOME | End: 2018-08-14
Attending: NURSE PRACTITIONER
Payer: MEDICARE

## 2018-08-14 DIAGNOSIS — Z78.0 POST-MENOPAUSAL: ICD-10-CM

## 2018-08-14 PROCEDURE — 77080 DXA BONE DENSITY AXIAL: CPT

## 2018-12-07 ENCOUNTER — TELEPHONE (OUTPATIENT)
Dept: SURGERY | Age: 73
End: 2018-12-07

## 2019-01-09 ENCOUNTER — OFFICE VISIT (OUTPATIENT)
Dept: SURGERY | Age: 74
End: 2019-01-09

## 2019-01-09 VITALS
HEIGHT: 62 IN | SYSTOLIC BLOOD PRESSURE: 137 MMHG | WEIGHT: 233.2 LBS | HEART RATE: 77 BPM | BODY MASS INDEX: 42.91 KG/M2 | RESPIRATION RATE: 16 BRPM | TEMPERATURE: 98.5 F | DIASTOLIC BLOOD PRESSURE: 64 MMHG

## 2019-01-09 DIAGNOSIS — C50.511 MALIGNANT NEOPLASM OF LOWER-OUTER QUADRANT OF RIGHT BREAST OF FEMALE, ESTROGEN RECEPTOR POSITIVE (HCC): Primary | ICD-10-CM

## 2019-01-09 DIAGNOSIS — Z17.0 MALIGNANT NEOPLASM OF LOWER-OUTER QUADRANT OF RIGHT BREAST OF FEMALE, ESTROGEN RECEPTOR POSITIVE (HCC): Primary | ICD-10-CM

## 2019-01-09 NOTE — PROGRESS NOTES
Breast Cancer       Ms. Juan Childers is a 75year old woman with ER/MT positive, HER negative T1cN0 right breast cancer s/p partial mastectomy and sentinel node biopsy by Dr. More Kam 11/4/15. She completed partial breast irradiation. She is taking Arimidex daily without untoward effects. Overall she is doing well and is without complaint related to her breasts. Breast/GYN history:    OB History     No data available           Past Medical History:   Diagnosis Date    Asthma     Breast cancer ([facility]) 2015    Right lumpectomy    Breast cyst     RT - removal    Depression     Diabetes ([facility])     Hypertension     [facility] ([facility])        Past Surgical History:   Procedure Laterality Date    HX BREAST LUMPECTOMY Right 11/4/2015    right breast lumpectomy with localization,sentinel lymph node biopsy,possible complete axillary dissection,insertion of charleen spacer  performed by Aga Bailey MD at [address] HX CHOLECYSTECTOMY      HX CHOLECYSTECTOMY      HX KNEE REPLACEMENT Bilateral     HX KNEE REPLACEMENT Bilateral        Current Outpatient Medications on File Prior to Visit   Medication Sig Dispense Refill    raNITIdine (ZANTAC) 150 mg tablet Take 150 mg by mouth two (2) times a day.  anastrozole (ARIMIDEX) 1 mg tablet Take 1 Tab by mouth daily. 90 Tab 3    hydroCHLOROthiazide (HYDRODIURIL) 25 mg tablet Take 25 mg by mouth daily.  QUEtiapine (SEROQUEL) 25 mg tablet Take  by mouth.  FLOVENT DISKUS 250 mcg/actuation dsdv       propranolol LA (INDERAL LA) 80 mg SR capsule       ergocalciferol (VITAMIN D2) 50,000 unit capsule Take 50,000 Units by mouth.  sitaGLIPtin (JANUVIA) 100 mg tablet Take 100 mg by mouth daily.  buPROPion SR (WELLBUTRIN SR) 200 mg SR tablet Take 200 mg by mouth two (2) times a day.  sertraline (ZOLOFT) 100 mg tablet Take  by mouth daily.  valsartan (DIOVAN) 320 mg tablet Take  by mouth daily.       diltiazem hcl 360 mg Tb24 Take  by mouth.      coenzyme q10-vitamin e (COQ10 ) 100-100 mg-unit cap Take  by mouth.  budesonide (PULMICORT) 180 mcg/actuation aepb inhaler Take  by inhalation.  PROPANTHELINE BROMIDE (PROPANTHELINE PO) Take  by mouth.  montelukast (SINGULAIR) 10 mg tablet Take 10 mg by mouth daily.  lovastatin (MEVACOR) 40 mg tablet Take 40 mg by mouth nightly.  zolpidem (AMBIEN) 10 mg tablet Take  by mouth nightly as needed for Sleep.  insulin glargine (LANTUS) 100 unit/mL injection 12 Units by SubCUTAneous route nightly.  repaglinide (PRANDIN) 0.5 mg tablet Take 0.5 mg by mouth Before breakfast, lunch, and dinner.  albuterol (PROVENTIL VENTOLIN) 2.5 mg /3 mL (0.083 %) nebulizer solution by Nebulization route once.  fluticasone (FLONASE) 50 mcg/actuation nasal spray nightly.  anastrozole (ARIMIDEX) 1 mg tablet TAKE 1 TABLET DAILY 90 Tab 2     No current facility-administered medications on file prior to visit.         Allergies   Allergen Reactions    Sulfites Cough     SULFITES IS IN PLAVIX       Social History     Tobacco Use    Smoking status: Never Smoker    Smokeless tobacco: Never Used   Substance Use Topics    Alcohol use: No     Alcohol/week: 0.0 oz    Drug use: No       Family History   Problem Relation Age of Onset    Alcohol abuse Mother     Hypertension Father     Cancer Father          ROS: positives are bolded  General: fevers, chills, night sweats, fatigue, weight loss, weight gain  GI: nausea, vomiting, abdominal pain, change in bowel habits, hematochezia, melena, GERD  Integ: dermatitis, abnormal moles  HEENT: visual changes, vertigo, epistaxis, dysphagia, hoarseness  Cardiac: chest pain, palpitations, HTN, edema, varicosities  Resp: cough, shortness of breath, wheezing, hemoptysis, snoring, reactive airway disease  : hematuria, dysuria, frequency, urgency, nocturia, stress urinary incontinence   MSK: weakness, joint pain, arthritis  Endocrine: diabetes, thyroid disease, polyuria, polydipsia, polyphagia, poor wound healing, heat intolerance, cold intolerance  Lymph/Heme: anemia, bruising, history of blood transfusions  Neuro: dizziness, headache, fainting, seizures, stroke  Psych: anxiety, depression    Physical Exam:  Visit Vitals  /64 (BP 1 Location: Left arm, BP Patient Position: Sitting)   Pulse 77   Temp 98.5 °F (36.9 °C) (Oral)   Resp 16   Ht 5' 2\" (1.575 m)   Wt 105.8 kg (233 lb 3.2 oz)   BMI 42.65 kg/m²       Gen:  No distress  Head: normocephalic, atraumatic  Mouth: Clear, no overt lesions, oral mucosa pink and moist  Neck: supple, no masses, no adenopathy, trachea midline  Resp: clear bilaterally  Cardio: Regular rate and rhythm  Abdomen: soft, nontender, nondistended  Extremeties: warm, well-perfused  Neuro: sensation and strength grossly intact and symmetrical  Psych: alert and oriented to person, place and time  Breasts:   Right: Examined in both the supine and upright positions. There was no supraclavicular, infraclavicular, or axillary lympadenopathy. There were no dominant masses, no skin changes, no asymmetry identified well healed surgical scars  Left: Examined in both the supine and upright positions. There was no supraclavicular, infraclavicular, or axillary lympadenopathy. There were no dominant masses, no skin changes, no asymmetry identified       Imagin18 bilateral mammo  No mammographic evidence of malignancy. Stable postsurgical and postradiation  changes right breast. Recommend annual screening mammography and clinical breast  examination.     Results given to patient at time of examination.     The patient will be notified by the [facility] reminder system for scheduling  of her annual diagnostic mammogram.     BI-RADS Assessment Category 2: Benign finding. 40 NM - Normal>40 Dense     Pathology:  A: LYMPH NODE, RIGHT AXILLA, SENTINEL #1, BIOPSY:  ONE LYMPH NODE WITH NO METASTATIC CARCINOMA (0/1).   AN IMMUNOPEROXIDASE STAIN FOR PAN-CYTOKERATIN ANTIGENS IS NEGATIVE. B: LYMPH NODE, RIGHT AXILLA, SENTINEL #2, BIOPSY:  TWO LYMPH NODES WITH NO METASTATIC CARCINOMA (0/2). IMMUNOPEROXIDASE STAINS FOR MUÑOZ-CYTOKERATIN ANTIGENS ARE NEGATIVE. C: BREAST, RIGHT, PARTIAL MASTECTOMY:  INFILTRATING DUCTAL CARCINOMA. SIZE OF THE INVASIVE CARCINOMA: 1.1 CM. HISTOLOGIC GRADE: II OF III. TUBULAR DIFFERENTIATION SCORE: 3.  NUCLEAR PLEOMORPHISM SCORE: 3. MITOTIC RATE SCORE: 1. TOTAL SCORE: 7. FOCALITY: UNIFOCAL. IN SITU CARCINOMA COMPONENT: INTERMEDIATE GRADE DUCTAL  CARCINOMA IN-SITU AND LOBULAR CARCINOMA IN-SITU COMPRISING  LESS THAN 25% OF THE TUMOR. LYMPHOVASCULAR SPACE INVASION: IDENTIFIED. MARGINS OF RESECTION: NOT INVOLVED. MICROCALCIFICATIONS: IDENTIFIED IN THE TUMOR, NORMAL ACINAR  STRUCTURES AND STROMA  ESTROGEN RECEPTOR: POSITIVE ([id number]). PROGESTERONE RECEPTOR: POSITIVE ([id number]). HER2-gisela (FISH): NEGATIVE ([id number]). AJCC STAGE: pT1c N0. Impression:  Patient Active Problem List   Diagnosis Code    Mass of right breast on mammogram And ultrasound-6:00,  6 cm from nipple N63.10    Asthma, endogenous J45.909    Hypertension, essential I10    Diabetes mellitus type 2, uncomplicated ([address] T61.9    Breast cancer, right ([address] C50.911    Obesity, morbid ([address]       75year old woman with ER/TX positive, HER negative T1cN0 right breast cancer s/p partial mastectomy and sentinel node biopsy by Dr. Lisa Kline 11/4/15. Continue Arimidex daily. She is due for bilateral mammogram June 2019. Follow up after imaging. She was asked to call with any additional questions or concerns.

## 2019-01-09 NOTE — LETTER
1/9/2019 10:41 AM 
 
Patient:  Kimberley Tafoya YOB: 1945 Date of Visit: 1/9/2019 Jo Luna NP 
[id number] [facility] [address] [id number] VIA Facsimile: [phone] Dear Jo Luna NP, 
 
 
I had the pleasure of seeing Ms. Ramond Pallas in my office today for follow up of her breast cancer in transition from Dr. Lisa Kline. I am including a copy of my office visit today. If you have questions, please do not hesitate to call me. I look forward to following Ms. Kirk along with you and will keep you updated as to her progress. Sincerely, Junior Xavier MD

## 2019-01-09 NOTE — PROGRESS NOTES
Chief Complaint   Patient presents with    Follow-up     Follow up breast exam s/p right breast lumpectomy for previous invasive ductal carcinoma 11.4. 15. BI 6.18.18. Bone Density 8.14.18. Denies concerns. 1. Have you been to the ER, urgent care clinic since your last visit? Hospitalized since your last visit? No    2. Have you seen or consulted any other health care providers outside of the [address] [place] [address] since your last visit? Include any pap smears or colon screening.  No

## 2019-01-09 NOTE — PATIENT INSTRUCTIONS
If you have any questions or concerns about today's appointment, the verbal and/or written instructions you were given for follow up care, please call our office at [phone].     [facility] - [address]    [phone] office  [phone]

## 2019-04-01 ENCOUNTER — TELEPHONE (OUTPATIENT)
Dept: SURGERY | Age: 74
End: 2019-04-01

## 2019-04-01 RX ORDER — ANASTROZOLE 1 MG/1
1 TABLET ORAL DAILY
Qty: 90 TAB | Refills: 1 | Status: SHIPPED | OUTPATIENT
Start: 2019-04-01 | End: 2020-09-15 | Stop reason: SDUPTHER

## 2019-04-01 NOTE — TELEPHONE ENCOUNTER
Received call from patient stating that she needed a refill of arimidex 1 mg PO daily to [facility]. OK to refill per Dr. Gloria Aragon. Dr. Gloria Aragon would like to refer her to med onc to manage in the future. Called patient to notify her. There was no answer so VM was left. Reminded patient to attend appt in July.

## 2019-07-10 ENCOUNTER — HOSPITAL ENCOUNTER (OUTPATIENT)
Dept: MAMMOGRAPHY | Age: 74
Discharge: HOME | End: 2019-07-10
Attending: SURGERY
Payer: MEDICARE

## 2019-07-10 ENCOUNTER — OFFICE VISIT (OUTPATIENT)
Dept: SURGERY | Age: 74
End: 2019-07-10

## 2019-07-10 VITALS
WEIGHT: 220 LBS | SYSTOLIC BLOOD PRESSURE: 148 MMHG | HEIGHT: 62 IN | DIASTOLIC BLOOD PRESSURE: 79 MMHG | HEART RATE: 66 BPM | BODY MASS INDEX: 40.48 KG/M2 | OXYGEN SATURATION: 93 % | TEMPERATURE: 98.4 F

## 2019-07-10 DIAGNOSIS — Z85.3 PERSONAL HISTORY OF BREAST CANCER: ICD-10-CM

## 2019-07-10 DIAGNOSIS — Z17.0 MALIGNANT NEOPLASM OF LOWER-OUTER QUADRANT OF RIGHT BREAST OF FEMALE, ESTROGEN RECEPTOR POSITIVE (HCC): Primary | ICD-10-CM

## 2019-07-10 DIAGNOSIS — C50.511 MALIGNANT NEOPLASM OF LOWER-OUTER QUADRANT OF RIGHT BREAST OF FEMALE, ESTROGEN RECEPTOR POSITIVE (HCC): Primary | ICD-10-CM

## 2019-07-10 PROCEDURE — 77062 BREAST TOMOSYNTHESIS BI: CPT

## 2019-07-10 NOTE — PROGRESS NOTES
Breast Cancer     Ms. Vijaya Deleon is a 75year old woman with right ER/SD positive, HER negative T1cN0 breast cancer s/p partial mastectomy and sentinel node biopsy by Dr. Libby Liu 11/4/15. She completed partial breast irradiation per Dr. Sophia Chen. She is taking Arimidex daily without untoward effects. Overall she is doing well and is without complaint related to her breasts. Breast/GYN history:    OB History    None          Past Medical History:   Diagnosis Date    Asthma     [facility] 2015    Right lumpectomy    Breast cyst     RT - removal    Depression     Diabetes [facility]     Hypertension     Menopause     Radiation therapy complication     2 times for 5 days    Thromboembolus [facility])        Past Surgical History:   Procedure Laterality Date    HX BREAST LUMPECTOMY Right 11/4/2015    right breast lumpectomy with localization,sentinel lymph node biopsy,possible complete axillary dissection,insertion of charleen spacer  performed by Ugo Couch MD at [address] HX CHOLECYSTECTOMY      HX CHOLECYSTECTOMY      HX KNEE REPLACEMENT Bilateral     HX KNEE REPLACEMENT Bilateral        Current Outpatient Medications on File Prior to Visit   Medication Sig Dispense Refill    raNITIdine (ZANTAC) 150 mg tablet Take 150 mg by mouth two (2) times a day.  anastrozole (ARIMIDEX) 1 mg tablet TAKE 1 TABLET DAILY 90 Tab 2    hydroCHLOROthiazide (HYDRODIURIL) 25 mg tablet Take 25 mg by mouth daily.  QUEtiapine (SEROQUEL) 25 mg tablet Take  by mouth.  FLOVENT DISKUS 250 mcg/actuation dsdv       propranolol LA (INDERAL LA) 80 mg SR capsule       ergocalciferol (VITAMIN D2) 50,000 unit capsule Take 50,000 Units by mouth.  sitaGLIPtin (JANUVIA) 100 mg tablet Take 100 mg by mouth daily.  buPROPion SR (WELLBUTRIN SR) 200 mg SR tablet Take 200 mg by mouth two (2) times a day.  sertraline (ZOLOFT) 100 mg tablet Take  by mouth daily.       valsartan (DIOVAN) 320 mg tablet Take  by mouth daily.  diltiazem hcl 360 mg Tb24 Take  by mouth.  coenzyme q10-vitamin e (COQ10 ) 100-100 mg-unit cap Take  by mouth.  budesonide (PULMICORT) 180 mcg/actuation aepb inhaler Take  by inhalation.  montelukast (SINGULAIR) 10 mg tablet Take 10 mg by mouth daily.  lovastatin (MEVACOR) 40 mg tablet Take 40 mg by mouth nightly.  insulin glargine (LANTUS) 100 unit/mL injection 10 Units by SubCUTAneous route nightly.  repaglinide (PRANDIN) 0.5 mg tablet Take 0.5 mg by mouth Before breakfast, lunch, and dinner.  albuterol (PROVENTIL VENTOLIN) 2.5 mg /3 mL (0.083 %) nebulizer solution by Nebulization route once.  fluticasone (FLONASE) 50 mcg/actuation nasal spray nightly.  anastrozole (ARIMIDEX) 1 mg tablet Take 1 mg by mouth daily. 90 Tab 1    PROPANTHELINE BROMIDE (PROPANTHELINE PO) Take  by mouth.  zolpidem (AMBIEN) 10 mg tablet Take  by mouth nightly as needed for Sleep. No current facility-administered medications on file prior to visit.         Allergies   Allergen Reactions    Sulfites Cough     SULFITES IS IN PLAVIX       Social History     Tobacco Use    Smoking status: Never Smoker    Smokeless tobacco: Never Used   Substance Use Topics    Alcohol use: No     Alcohol/week: 0.0 oz    Drug use: No       Family History   Problem Relation Age of Onset    Alcohol abuse Mother     Hypertension Father     Cancer Father          ROS: positives are bolded  General: fevers, chills, night sweats, fatigue, weight loss, weight gain  GI: nausea, vomiting, abdominal pain, change in bowel habits, hematochezia, melena, GERD  Integ: dermatitis, abnormal moles  HEENT: visual changes, vertigo, epistaxis, dysphagia, hoarseness  Cardiac: chest pain, palpitations, HTN, edema, varicosities  Resp: cough, shortness of breath, wheezing, hemoptysis, snoring, reactive airway disease  : hematuria, dysuria, frequency, urgency, nocturia, stress urinary incontinence   MSK: weakness, joint pain, arthritis  Endocrine: diabetes, thyroid disease, polyuria, polydipsia, polyphagia, poor wound healing, heat intolerance, cold intolerance  Lymph/Heme: anemia, bruising, history of blood transfusions  Neuro: dizziness, headache, fainting, seizures, stroke  Psych: anxiety, depression    Physical Exam:  Visit Vitals  /79 (BP 1 Location: Right arm, BP Patient Position: Sitting)   Pulse 66   Temp 98.4 °F (36.9 °C) (Oral)   Ht 5' 2\" (1.575 m)   Wt 99.8 kg (220 lb)   SpO2 93%   BMI 40.24 kg/m²       Gen:  No distress  Head: normocephalic, atraumatic  Mouth: Clear, no overt lesions, oral mucosa pink and moist  Neck: supple, no masses, no adenopathy, trachea midline  Resp: clear bilaterally  Cardio: Regular rate and rhythm  Abdomen: soft, nontender, nondistended  Extremeties: warm, well-perfused  Neuro: sensation and strength grossly intact and symmetrical  Psych: alert and oriented to person, place and time  Breasts:   Right: Examined in both the supine and upright positions. There was no supraclavicular, infraclavicular, or axillary lympadenopathy. There were no dominant masses, no skin changes, no asymmetry identified well healed surgical scars  Left: Examined in both the supine and upright positions. There was no supraclavicular, infraclavicular, or axillary lympadenopathy. There were no dominant masses, no skin changes, no asymmetry identified       Imagin/10/19 bilateral mammo  Unchanged benign-appearing posttreatment findings in the right breast. No  mammographic evidence of malignancy. Recommend annual screening mammography.     A result letter will be sent to the patient.     The patient will be notified by the [facility] reminder system for scheduling  of her annual screening mammogram.     BI-RADS Assessment Category 2: Benign    18 bilateral mammo  No mammographic evidence of malignancy.  Stable postsurgical and postradiation  changes right breast. Recommend annual screening mammography and clinical breast  examination.     Results given to patient at time of examination.     The patient will be notified by the [facility] reminder system for scheduling  of her annual diagnostic mammogram.     BI-RADS Assessment Category 2: Benign finding. 40 NM - Normal>40 Dense     Pathology:  A: LYMPH NODE, RIGHT AXILLA, SENTINEL #1, BIOPSY:  ONE LYMPH NODE WITH NO METASTATIC CARCINOMA (0/1). AN IMMUNOPEROXIDASE STAIN FOR PAN-CYTOKERATIN ANTIGENS IS NEGATIVE. B: LYMPH NODE, RIGHT AXILLA, SENTINEL #2, BIOPSY:  TWO LYMPH NODES WITH NO METASTATIC CARCINOMA (0/2). IMMUNOPEROXIDASE STAINS FOR MUÑOZ-CYTOKERATIN ANTIGENS ARE NEGATIVE. C: BREAST, RIGHT, PARTIAL MASTECTOMY:  INFILTRATING DUCTAL CARCINOMA. SIZE OF THE INVASIVE CARCINOMA: 1.1 CM. HISTOLOGIC GRADE: II OF III. TUBULAR DIFFERENTIATION SCORE: 3.  NUCLEAR PLEOMORPHISM SCORE: 3. MITOTIC RATE SCORE: 1. TOTAL SCORE: 7. FOCALITY: UNIFOCAL. IN SITU CARCINOMA COMPONENT: INTERMEDIATE GRADE DUCTAL  CARCINOMA IN-SITU AND LOBULAR CARCINOMA IN-SITU COMPRISING  LESS THAN 25% OF THE TUMOR. LYMPHOVASCULAR SPACE INVASION: IDENTIFIED. MARGINS OF RESECTION: NOT INVOLVED. MICROCALCIFICATIONS: IDENTIFIED IN THE TUMOR, NORMAL ACINAR  STRUCTURES AND STROMA  ESTROGEN RECEPTOR: POSITIVE ([id number]). PROGESTERONE RECEPTOR: POSITIVE ([id number]). HER2-gisela (FISH): NEGATIVE ([id number]). AJCC STAGE: pT1c N0. Impression:  Patient Active Problem List   Diagnosis Code    Mass of right breast on mammogram And ultrasound-6:00,  6 cm from nipple N63.10    Asthma, endogenous J45.909    Hypertension, essential I10    Diabetes mellitus type 2, uncomplicated ([address] L33.1    Breast cancer, right ([address] C50.911    Obesity, morbid ([address]       75year old woman with right  ER/FL positive, HER negative T1cN0 breast cancer s/p partial mastectomy and sentinel node biopsy by Dr. Alejandro Jaime 11/4/15. Continue Arimidex daily.  I have recommended she meet with medical oncology for future prescriptions. She is due for bilateral mammogram June 2020. Follow up after imaging. She was asked to call with any additional questions or concerns.

## 2019-07-10 NOTE — LETTER
7/10/2019 2:20 PM 
 
Patient:  Tracey Brady YOB: 1945 Date of Visit: 7/10/2019 Dona Cordero MD 
[id number] [facility] [address] [id number] VIA Facsimile: [phone] Dear Dona Cordero MD, 
 
 
I had the pleasure of seeing Ms. Ena Otto in my office today for follow up of her right breast cancer. I am including a copy of my office visit today. If you have questions, please do not hesitate to call me. I look forward to following Ms. Kirk along with you and will keep you updated as to her progress. Sincerely, Alonzo Barragan MD

## 2019-07-10 NOTE — PATIENT INSTRUCTIONS
If you have any questions or concerns about today's appointment, the verbal and/or written instructions you were given for follow up care, please call our office at [phone].     [facility] - [address]    [phone] office  [phone]

## 2019-07-10 NOTE — PROGRESS NOTES
1. Have you been to the ER, urgent care clinic since your last visit? Hospitalized since your last visit? No    2. Have you seen or consulted any other health care providers outside of the [address] [place] [address] since your last visit? Include any pap smears or colon screening. No     Patient presents for follow up with ER/VT positive, HER negative T1cN0 right breast cancer s/p partial mastectomy and sentinel node biopsy by Dr. Torrie Bladeras 11/4/15.

## 2019-07-24 ENCOUNTER — OFFICE VISIT (OUTPATIENT)
Dept: ONCOLOGY | Age: 74
End: 2019-07-24

## 2019-07-24 ENCOUNTER — TELEPHONE (OUTPATIENT)
Dept: ONCOLOGY | Age: 74
End: 2019-07-24

## 2019-07-24 VITALS
HEART RATE: 69 BPM | DIASTOLIC BLOOD PRESSURE: 73 MMHG | BODY MASS INDEX: 40.97 KG/M2 | SYSTOLIC BLOOD PRESSURE: 151 MMHG | OXYGEN SATURATION: 92 % | TEMPERATURE: 98.9 F | WEIGHT: 224 LBS | RESPIRATION RATE: 18 BRPM

## 2019-07-24 DIAGNOSIS — Z17.0 MALIGNANT NEOPLASM OF LOWER-OUTER QUADRANT OF RIGHT BREAST OF FEMALE, ESTROGEN RECEPTOR POSITIVE (HCC): Primary | ICD-10-CM

## 2019-07-24 DIAGNOSIS — C50.511 MALIGNANT NEOPLASM OF LOWER-OUTER QUADRANT OF RIGHT BREAST OF FEMALE, ESTROGEN RECEPTOR POSITIVE (HCC): Primary | ICD-10-CM

## 2019-07-24 DIAGNOSIS — M85.80 OSTEOPENIA, UNSPECIFIED LOCATION: ICD-10-CM

## 2019-07-24 DIAGNOSIS — E66.01 OBESITY, MORBID (HCC): ICD-10-CM

## 2019-07-24 RX ORDER — ASPIRIN 325 MG
TABLET, DELAYED RELEASE (ENTERIC COATED) ORAL DAILY
COMMUNITY

## 2019-07-24 RX ORDER — ALENDRONATE SODIUM 10 MG/1
10 TABLET ORAL
Qty: 180 TAB | Refills: 3 | Status: SHIPPED | OUTPATIENT
Start: 2019-07-24 | End: 2019-10-22

## 2019-07-24 RX ORDER — BUPROPION HYDROCHLORIDE 200 MG/1
200 TABLET, EXTENDED RELEASE ORAL 2 TIMES DAILY
COMMUNITY

## 2019-07-24 RX ORDER — MULTIVIT WITH MINERALS/HERBS
1 TABLET ORAL DAILY
COMMUNITY

## 2019-07-24 RX ORDER — ANASTROZOLE 1 MG/1
1 TABLET ORAL DAILY
Qty: 30 TAB | Refills: 11 | Status: SHIPPED | OUTPATIENT
Start: 2019-07-24 | End: 2020-09-15 | Stop reason: SDUPTHER

## 2019-07-24 RX ORDER — CLOBETASOL PROPIONATE 0.5 MG/G
OINTMENT TOPICAL AS NEEDED
COMMUNITY

## 2019-07-24 RX ORDER — LOSARTAN POTASSIUM 100 MG/1
100 TABLET ORAL DAILY
COMMUNITY

## 2019-07-24 NOTE — PROGRESS NOTES
[facility], [address]  [place], [place]  Office Phone: [phone]  Fax: [phone]      Reason for visit:  No chief complaint on file. HPI:   Migel Raygoza is a 75 y.o.  female who I was asked to see in consultation at the request of Dr. Kiara Leonard for evaluation for ER/NH(+), Her2(-), Stage IA (T1c N0 M0), s/p adjuvant RT, currently on adjuvant anastrozole. Patient was seen and examined today. She is awake alert oriented x3. She is tolerating well anastrozole with no side effects. Mammogram done on 7/10/2019 showed BI-RADS 2 benign. DEXA scan done on 8/14/2018 showed osteopenia. Her only complaint today is generalized aches and pains which started before she was on Arimidex. Otherwise denies any fevers, chills, shortness of breath, nausea vomiting or abdominal pain. No lumps and bumps. No focal neurologic deficit. ECOG performance status 0. Independent with ADLs and IADLs. DX:Breast cancer    STAGE: Stage IA (T1c N0 M0)    ONCOLOGY HISTORY:   6/18/18 bilateral mammo  No mammographic evidence of malignancy. Stable postsurgical and postradiation changes right breast. Recommend annual screening mammography and clinical breast examination. BI-RADS Assessment Category 2: Benign finding. 40 NM - Normal>40 Dense      11/04/15:Underwent lumpectomy  A: LYMPH NODE, RIGHT AXILLA, SENTINEL #1, BIOPSY:  ONE LYMPH NODE WITH NO METASTATIC CARCINOMA (0/1). AN IMMUNOPEROXIDASE STAIN FOR PAN-CYTOKERATIN ANTIGENS IS NEGATIVE. B: LYMPH NODE, RIGHT AXILLA, SENTINEL #2, BIOPSY:  TWO LYMPH NODES WITH NO METASTATIC CARCINOMA (0/2). IMMUNOPEROXIDASE STAINS FOR MUÑOZ-CYTOKERATIN ANTIGENS ARE NEGATIVE. C: BREAST, RIGHT, PARTIAL MASTECTOMY:  INFILTRATING DUCTAL CARCINOMA. SIZE OF THE INVASIVE CARCINOMA: 1.1 CM. HISTOLOGIC GRADE: II OF III. TUBULAR DIFFERENTIATION SCORE: 3.  NUCLEAR PLEOMORPHISM SCORE: 3. MITOTIC RATE SCORE: 1.   TOTAL SCORE: 7. FOCALITY: UNIFOCAL. IN SITU CARCINOMA COMPONENT: INTERMEDIATE GRADE DUCTAL  CARCINOMA IN-SITU AND LOBULAR CARCINOMA IN-SITU COMPRISING  LESS THAN 25% OF THE TUMOR. LYMPHOVASCULAR SPACE INVASION: IDENTIFIED. MARGINS OF RESECTION: NOT INVOLVED. MICROCALCIFICATIONS: IDENTIFIED IN THE TUMOR, NORMAL ACINAR  STRUCTURES AND STROMA  ESTROGEN RECEPTOR: POSITIVE ([id number]). PROGESTERONE RECEPTOR: POSITIVE ([id number]). HER2-gisela (FISH): NEGATIVE ([id number]). AJCC STAGE: pT1c N0.    7/10/2019: Mammogram showed BI-RADS 2 benign  7/24/19: First medical oncology with me      Past Medical History:   Diagnosis Date    Asthma     Breast cancer ([facility] [address] 2015    Right lumpectomy    Breast cyst     RT - removal    Depression     Diabetes ([facility] [address]     Hypertension     Menopause     Radiation therapy complication     2 times for 5 days    Thromboembolus [facility])      Past Surgical History:   Procedure Laterality Date    HX BREAST LUMPECTOMY Right 11/4/2015    right breast lumpectomy with localization,sentinel lymph node biopsy,possible complete axillary dissection,insertion of chalreen spacer  performed by Aron Gee [facility] [address] HX CHOLECYSTECTOMY      HX CHOLECYSTECTOMY      HX KNEE REPLACEMENT Bilateral     HX KNEE REPLACEMENT Bilateral      Social History     Socioeconomic History    Marital status:      Spouse name: Not on file    Number of children: Not on file    Years of education: Not on file    Highest education level: Not on file   Tobacco Use    Smoking status: Never Smoker    Smokeless tobacco: Never Used   Substance and Sexual Activity    Alcohol use: No     Alcohol/week: 0.0 standard drinks    Drug use: No   Other Topics Concern     Family History   Problem Relation Age of Onset    Alcohol abuse Mother     Hypertension Father     Cancer Father        Current Outpatient Medications   Medication Sig Dispense Refill    anastrozole (ARIMIDEX) 1 mg tablet Take 1 mg by mouth daily.  80 Tab 1    raNITIdine (ZANTAC) 150 mg tablet Take 150 mg by mouth two (2) times a day.  anastrozole (ARIMIDEX) 1 mg tablet TAKE 1 TABLET DAILY 90 Tab 2    hydroCHLOROthiazide (HYDRODIURIL) 25 mg tablet Take 25 mg by mouth daily.  QUEtiapine (SEROQUEL) 25 mg tablet Take  by mouth.  FLOVENT DISKUS 250 mcg/actuation dsdv       propranolol LA (INDERAL LA) 80 mg SR capsule       ergocalciferol (VITAMIN D2) 50,000 unit capsule Take 50,000 Units by mouth.  sitaGLIPtin (JANUVIA) 100 mg tablet Take 100 mg by mouth daily.  buPROPion SR (WELLBUTRIN SR) 200 mg SR tablet Take 200 mg by mouth two (2) times a day.  sertraline (ZOLOFT) 100 mg tablet Take  by mouth daily.  valsartan (DIOVAN) 320 mg tablet Take  by mouth daily.  diltiazem hcl 360 mg Tb24 Take  by mouth.  coenzyme q10-vitamin e (COQ10 ) 100-100 mg-unit cap Take  by mouth.  budesonide (PULMICORT) 180 mcg/actuation aepb inhaler Take  by inhalation.  PROPANTHELINE BROMIDE (PROPANTHELINE PO) Take  by mouth.  montelukast (SINGULAIR) 10 mg tablet Take 10 mg by mouth daily.  lovastatin (MEVACOR) 40 mg tablet Take 40 mg by mouth nightly.  zolpidem (AMBIEN) 10 mg tablet Take  by mouth nightly as needed for Sleep.  insulin glargine (LANTUS) 100 unit/mL injection 10 Units by SubCUTAneous route nightly.  repaglinide (PRANDIN) 0.5 mg tablet Take 0.5 mg by mouth Before breakfast, lunch, and dinner.  albuterol (PROVENTIL VENTOLIN) 2.5 mg /3 mL (0.083 %) nebulizer solution by Nebulization route once.  fluticasone (FLONASE) 50 mcg/actuation nasal spray nightly. Allergies   Allergen Reactions    Sulfites Cough     SULFITES IS IN PLAVIX       Review of Systems  No fevers, chills, shortness of breath, nausea vomiting or abdominal pain. Only complaint is generalized aches and pains. Remaining all 12 point review of system negative.     Objective:  Physical Exam:  Visit Vitals  BP 151/73 (BP 1 Location: Left arm, BP Patient Position: Sitting)   Pulse 69   Temp 98.9 °F (37.2 °C) (Oral)   Resp 18   Wt 101.6 kg (224 lb)   SpO2 92%   BMI 40.97 kg/m²         General:  Alert, cooperative, no distress, appears stated age. Head:  Normocephalic, without obvious abnormality, atraumatic. Eyes:  Conjunctivae/corneas clear. PERRL, EOMs intact. Throat: Lips, mucosa, and tongue normal.    Neck: Supple, symmetrical, trachea midline, no adenopathy, thyroid: no enlargement/tenderness/nodules   Back:   Symmetric, no curvature. ROM normal. No CVA tenderness. Lungs:   Clear to auscultation bilaterally. Chest wall:  No tenderness or deformity. Heart:  Regular rate and rhythm, S1, S2 normal, no murmur, click, rub or gallop. Breast Exam:  No tenderness, masses, or nipple abnormality. Right surgical site well healed, no evidence of recurrence. Palpable scar tissue   Abdomen:   Soft, non-tender. Bowel sounds normal. No masses,  No organomegaly. Extremities: Extremities normal, atraumatic, no cyanosis or edema. Skin: Skin color, texture, turgor normal. No rashes or lesions. Lymph nodes: Cervical, supraclavicular, and axillary nodes normal.   Neurologic: CNII-XII intact. Diagnostic Imaging     No results found for this or any previous visit.     Lab Results  Lab Results   Component Value Date/Time    WBC 8.5 09/27/2016 03:35 PM    HGB 13.7 09/27/2016 03:35 PM    HCT 42.4 09/27/2016 03:35 PM    PLATELET 212 92/91/9586 03:35 PM    MCV 95.5 09/27/2016 03:35 PM       Lab Results   Component Value Date/Time    Sodium 141 09/27/2016 03:35 PM    Potassium 4.3 09/27/2016 03:35 PM    Chloride 103 09/27/2016 03:35 PM    CO2 28 09/27/2016 03:35 PM    Anion gap 10 09/27/2016 03:35 PM    Glucose 235 (H) 09/27/2016 03:35 PM    BUN 23 (H) 09/27/2016 03:35 PM    Creatinine 1.06 09/27/2016 03:35 PM    BUN/Creatinine ratio 22 (H) 09/27/2016 03:35 PM    GFR est AA >60 09/27/2016 03:35 PM    GFR est non-AA 51 (L) 09/27/2016 03:35 PM    Calcium 9.7 09/27/2016 03:35 PM    AST (SGOT) 39 (H) 09/27/2016 03:35 PM    Alk. phosphatase 131 (H) 09/27/2016 03:35 PM    Protein, total 6.6 09/27/2016 03:35 PM    Albumin 3.5 09/27/2016 03:35 PM    Globulin 3.1 09/27/2016 03:35 PM    A-G Ratio 1.1 09/27/2016 03:35 PM    ALT (SGPT) 38 09/27/2016 03:35 PM       Assessment/Plan:  75 y.o. female  1. Breast cancer: Patient with stage Ia (T1c N0 M0) breast cancer, currently on adjuvant Arimidex, here for follow-up. *Continue Arimidex for 5-10years  *Diet exercise and weight loss program  *Check baseline labs with CBC and CMP  *Consider Fosamax or Prolia  *Check vitamin D level  *Continue vitamin D  *Next mammogram due in July 2020    2. Osteopenia:  *Continue vitamin D  *Consider either Fosamax or Prolia  *Weightbearing exercise    3.   Morbid obesity: Diet exercise and weight loss program        Return in 6 months

## 2019-07-24 NOTE — PROGRESS NOTES
Ranjit Coronel is a 75 y.o. female presenting today for a new patient appointment. Patient is ambulatory with no assistive devices and reports generalized aches and pains. Chief Complaint   Patient presents with    Breast Cancer       Visit Vitals  /73 (BP 1 Location: Left arm, BP Patient Position: Sitting)   Pulse 69   Temp 98.9 °F (37.2 °C) (Oral)   Resp 18   Wt 224 lb (101.6 kg)   SpO2 92%   BMI 40.97 kg/m²       Current Outpatient Medications   Medication Sig    anastrozole (ARIMIDEX) 1 mg tablet Take 1 mg by mouth daily.  alendronate (FOSAMAX) 10 mg tablet Take 1 Tab by mouth Daily (before breakfast) for 90 days.  aspirin delayed-release 325 mg tablet Take  by mouth daily.  clobetasol (TEMOVATE) 0.05 % ointment Apply  to affected area as needed for Skin Irritation.  b complex vitamins tablet Take 1 Tab by mouth daily.  buPROPion SR (WELLBUTRIN, ZYBAN) 200 mg SR tablet Take 200 mg by mouth two (2) times a day.  losartan (COZAAR) 100 mg tablet Take 100 mg by mouth daily.  anastrozole (ARIMIDEX) 1 mg tablet Take 1 mg by mouth daily.  raNITIdine (ZANTAC) 150 mg tablet Take 150 mg by mouth two (2) times a day.  anastrozole (ARIMIDEX) 1 mg tablet TAKE 1 TABLET DAILY    hydroCHLOROthiazide (HYDRODIURIL) 25 mg tablet Take 25 mg by mouth daily.  QUEtiapine (SEROQUEL) 25 mg tablet Take  by mouth.  FLOVENT DISKUS 250 mcg/actuation dsdv     propranolol LA (INDERAL LA) 80 mg SR capsule     ergocalciferol (VITAMIN D2) 50,000 unit capsule Take 50,000 Units by mouth every seven (7) days.  sitaGLIPtin (JANUVIA) 100 mg tablet Take 100 mg by mouth daily.  sertraline (ZOLOFT) 100 mg tablet Take  by mouth daily.  diltiazem hcl 360 mg Tb24 Take  by mouth.  coenzyme q10-vitamin e (COQ10 ) 100-100 mg-unit cap Take  by mouth.  montelukast (SINGULAIR) 10 mg tablet Take 10 mg by mouth daily.  lovastatin (MEVACOR) 40 mg tablet Take 40 mg by mouth nightly.     insulin glargine (LANTUS) 100 unit/mL injection 10 Units by SubCUTAneous route nightly.  repaglinide (PRANDIN) 0.5 mg tablet Take 0.5 mg by mouth Before breakfast, lunch, and dinner.  albuterol (PROVENTIL VENTOLIN) 2.5 mg /3 mL (0.083 %) nebulizer solution by Nebulization route once.  fluticasone (FLONASE) 50 mcg/actuation nasal spray nightly.  PROPANTHELINE BROMIDE (PROPANTHELINE PO) Take  by mouth.  zolpidem (AMBIEN) 10 mg tablet Take  by mouth nightly as needed for Sleep. No current facility-administered medications for this visit. Medications no longer taking/discontinued: propantheline bromide, ambien    No flowsheet data found. 3 most recent PHQ Screens 7/11/2017   Little interest or pleasure in doing things Not at all   Feeling down, depressed, irritable, or hopeless Not at all   Total Score PHQ 2 0       No flowsheet data found.     Learning Assessment 7/11/2017   PRIMARY LEARNER Patient   HIGHEST LEVEL OF EDUCATION - PRIMARY LEARNER  4 YEARS OF COLLEGE   BARRIERS PRIMARY LEARNER OTHER   CO-LEARNER CAREGIVER No   PRIMARY LANGUAGE ENGLISH   LEARNER PREFERENCE PRIMARY READING   ANSWERED BY patient   RELATIONSHIP SELF

## 2019-07-24 NOTE — TELEPHONE ENCOUNTER
Left  for [address] [place] [address]  Dentistry [phone] requesting notes from patient's last dental visit.

## 2019-07-26 ENCOUNTER — HOSPITAL ENCOUNTER (OUTPATIENT)
Dept: INFUSION THERAPY | Age: 74
Discharge: HOME | End: 2019-07-26
Payer: MEDICARE

## 2019-07-26 VITALS
OXYGEN SATURATION: 92 % | HEART RATE: 69 BPM | DIASTOLIC BLOOD PRESSURE: 73 MMHG | SYSTOLIC BLOOD PRESSURE: 151 MMHG | TEMPERATURE: 98.9 F

## 2019-07-26 DIAGNOSIS — Z17.0 MALIGNANT NEOPLASM OF LOWER-OUTER QUADRANT OF RIGHT BREAST OF FEMALE, ESTROGEN RECEPTOR POSITIVE (HCC): ICD-10-CM

## 2019-07-26 DIAGNOSIS — C50.511 MALIGNANT NEOPLASM OF LOWER-OUTER QUADRANT OF RIGHT BREAST OF FEMALE, ESTROGEN RECEPTOR POSITIVE (HCC): ICD-10-CM

## 2019-07-26 DIAGNOSIS — M85.80 OSTEOPENIA, UNSPECIFIED LOCATION: ICD-10-CM

## 2019-07-26 LAB
ALBUMIN SERPL-MCNC: 3.6 G/DL (ref 3.4–5)
ALBUMIN/GLOB SERPL: 1 {RATIO} (ref 0.8–1.7)
ALP SERPL-CCNC: 96 U/L (ref 45–117)
ALT SERPL-CCNC: 22 U/L (ref 13–56)
ANION GAP SERPL CALC-SCNC: 3 MMOL/L (ref 3–18)
AST SERPL-CCNC: 14 U/L (ref 10–38)
BASO+EOS+MONOS # BLD AUTO: 0.6 K/UL (ref 0–2.3)
BASO+EOS+MONOS NFR BLD AUTO: 8 % (ref 0.1–17)
BILIRUB SERPL-MCNC: 0.5 MG/DL (ref 0.2–1)
BUN SERPL-MCNC: 30 MG/DL (ref 7–18)
BUN/CREAT SERPL: 26 (ref 12–20)
CALCIUM SERPL-MCNC: 9.7 MG/DL (ref 8.5–10.1)
CHLORIDE SERPL-SCNC: 104 MMOL/L (ref 100–111)
CO2 SERPL-SCNC: 33 MMOL/L (ref 21–32)
CREAT SERPL-MCNC: 1.17 MG/DL (ref 0.6–1.3)
DIFFERENTIAL METHOD BLD: NORMAL
ERYTHROCYTE [DISTWIDTH] IN BLOOD BY AUTOMATED COUNT: 14.2 % (ref 11.5–14.5)
GLOBULIN SER CALC-MCNC: 3.5 G/DL (ref 2–4)
GLUCOSE SERPL-MCNC: 168 MG/DL (ref 74–99)
HCT VFR BLD AUTO: 43 % (ref 36–48)
HGB BLD-MCNC: 13.6 G/DL (ref 12–16)
LYMPHOCYTES # BLD: 1.9 K/UL (ref 1.1–5.9)
LYMPHOCYTES NFR BLD: 24 % (ref 14–44)
MCH RBC QN AUTO: 30.2 PG (ref 25–35)
MCHC RBC AUTO-ENTMCNC: 31.6 G/DL (ref 31–37)
MCV RBC AUTO: 95.3 FL (ref 78–102)
NEUTS SEG # BLD: 5.4 K/UL (ref 1.8–9.5)
NEUTS SEG NFR BLD: 68 % (ref 40–70)
PLATELET # BLD AUTO: 204 K/UL (ref 140–440)
POTASSIUM SERPL-SCNC: 4 MMOL/L (ref 3.5–5.5)
PROT SERPL-MCNC: 7.1 G/DL (ref 6.4–8.2)
RBC # BLD AUTO: 4.51 M/UL (ref 4.1–5.1)
SODIUM SERPL-SCNC: 140 MMOL/L (ref 136–145)
WBC # BLD AUTO: 7.9 K/UL (ref 4.5–13)

## 2019-07-26 PROCEDURE — 80053 COMPREHEN METABOLIC PANEL: CPT

## 2019-07-26 PROCEDURE — 36415 COLL VENOUS BLD VENIPUNCTURE: CPT

## 2019-07-26 PROCEDURE — 85025 COMPLETE CBC W/AUTO DIFF WBC: CPT

## 2019-07-26 PROCEDURE — 82306 VITAMIN D 25 HYDROXY: CPT

## 2019-07-26 NOTE — PROGRESS NOTES
[facility] Progress Note    Date: 2019    Name: Severo Ards    MRN: [id number]         : 1945    Peripheral Lab Draw      Ms. Kirk to [facility], ambulatory at 1421 accompanied by self. Pt was assessed and education was provided. Ms. Brayden Diana vitals were reviewed and patient was observed for 5 minutes prior to treatment. Visit Vitals  /73 (BP 1 Location: Left arm, BP Patient Position: Sitting)   Pulse 69   Temp 98.9 °F (37.2 °C)   SpO2 92%     Recent Results (from the past 12 hour(s))   CBC WITH 3 PART DIFF    Collection Time: 19  2:27 PM   Result Value Ref Range    WBC 7.9 4.5 - 13.0 K/uL    RBC 4.51 4.10 - 5.10 M/uL    HGB 13.6 12.0 - 16.0 g/dL    HCT 43.0 36 - 48 %    MCV 95.3 78 - 102 FL    MCH 30.2 25.0 - 35.0 PG    MCHC 31.6 31 - 37 g/dL    RDW 14.2 11.5 - 14.5 %    PLATELET 649 364 - 309 K/uL    NEUTROPHILS 68 40 - 70 %    MIXED CELLS 8 0.1 - 17 %    LYMPHOCYTES 24 14 - 44 %    ABS. NEUTROPHILS 5.4 1.8 - 9.5 K/UL    ABS. MIXED CELLS 0.6 0.0 - 2.3 K/uL    ABS. LYMPHOCYTES 1.9 1.1 - 5.9 K/UL    DF AUTOMATED         Blood obtained peripherally from left arm 1 attempt with butterfly needle and sent to lab for Cbc w/diff , Cmp and Vitamin D 25 Hydroxy per written orders. No bleeding or hematoma noted at site. Gauze and coban applied. Ms. Elida Curling tolerated the phlebotomy, and had no complaints. Patient armband removed and shredded. Ms. Elida Curling was discharged from [place] [address] in stable condition at [phone].      Jamia Moy Phlebotomist PCT  2019  3:10 PM

## 2019-07-27 LAB — 25(OH)D3 SERPL-MCNC: 113.6 NG/ML (ref 30–100)

## 2020-01-22 ENCOUNTER — TELEPHONE (OUTPATIENT)
Dept: ONCOLOGY | Age: 75
End: 2020-01-22

## 2020-01-22 NOTE — TELEPHONE ENCOUNTER
Patient called back and reschedule      Left message on voicemail concerning missed appointment 1/22/2020

## 2020-01-29 ENCOUNTER — OFFICE VISIT (OUTPATIENT)
Dept: ONCOLOGY | Age: 75
End: 2020-01-29

## 2020-01-29 VITALS
WEIGHT: 229 LBS | OXYGEN SATURATION: 96 % | RESPIRATION RATE: 18 BRPM | HEART RATE: 71 BPM | HEIGHT: 62 IN | SYSTOLIC BLOOD PRESSURE: 129 MMHG | BODY MASS INDEX: 42.14 KG/M2 | DIASTOLIC BLOOD PRESSURE: 68 MMHG | TEMPERATURE: 98.1 F

## 2020-01-29 DIAGNOSIS — M85.80 OSTEOPENIA, UNSPECIFIED LOCATION: ICD-10-CM

## 2020-01-29 DIAGNOSIS — C50.511 MALIGNANT NEOPLASM OF LOWER-OUTER QUADRANT OF RIGHT BREAST OF FEMALE, ESTROGEN RECEPTOR POSITIVE (HCC): Primary | ICD-10-CM

## 2020-01-29 DIAGNOSIS — Z17.0 MALIGNANT NEOPLASM OF LOWER-OUTER QUADRANT OF RIGHT BREAST OF FEMALE, ESTROGEN RECEPTOR POSITIVE (HCC): Primary | ICD-10-CM

## 2020-01-29 RX ORDER — AZITHROMYCIN 250 MG/1
TABLET, FILM COATED ORAL
COMMUNITY
Start: 2020-01-07 | End: 2020-09-15 | Stop reason: ALTCHOICE

## 2020-01-29 RX ORDER — BLOOD-GLUCOSE METER
KIT MISCELLANEOUS
COMMUNITY
Start: 2019-11-12

## 2020-01-29 RX ORDER — PREDNISONE 20 MG/1
TABLET ORAL
COMMUNITY
Start: 2020-01-07

## 2020-01-29 RX ORDER — FAMOTIDINE 20 MG/1
TABLET, FILM COATED ORAL
COMMUNITY
Start: 2019-11-06

## 2020-01-29 RX ORDER — ALENDRONATE SODIUM 10 MG/1
TABLET ORAL
COMMUNITY
Start: 2019-12-30 | End: 2020-10-21 | Stop reason: SDUPTHER

## 2020-01-29 NOTE — PROGRESS NOTES
Juan Huston is a 75 y.o. female presenting today for a follow-up appointment. Patient is ambulatory with no assistive devices, denies any generalized pain. Patient has no other complaints at this time. Chief Complaint   Patient presents with    Breast Cancer    Follow-up     Visit Vitals  /68 (BP 1 Location: Left arm, BP Patient Position: Sitting)   Pulse 71   Temp 98.1 °F (36.7 °C) (Oral)   Resp 18   Ht 5' 2\" (1.575 m)   Wt 229 lb (103.9 kg)   SpO2 96%   BMI 41.88 kg/m²     Current Outpatient Medications   Medication Sig    alendronate (FOSAMAX) 10 mg tablet     famotidine (PEPCID) 20 mg tablet     FREESTYLE LITE STRIPS strip     predniSONE (DELTASONE) 20 mg tablet     aspirin delayed-release 325 mg tablet Take  by mouth daily.  clobetasol (TEMOVATE) 0.05 % ointment Apply  to affected area as needed for Skin Irritation.  b complex vitamins tablet Take 1 Tab by mouth daily.  buPROPion SR (WELLBUTRIN, ZYBAN) 200 mg SR tablet Take 200 mg by mouth two (2) times a day.  losartan (COZAAR) 100 mg tablet Take 100 mg by mouth daily.  raNITIdine (ZANTAC) 150 mg tablet Take 150 mg by mouth two (2) times a day.  anastrozole (ARIMIDEX) 1 mg tablet TAKE 1 TABLET DAILY    hydroCHLOROthiazide (HYDRODIURIL) 25 mg tablet Take 25 mg by mouth daily.  QUEtiapine (SEROQUEL) 25 mg tablet Take  by mouth.  FLOVENT DISKUS 250 mcg/actuation dsdv     propranolol LA (INDERAL LA) 80 mg SR capsule     ergocalciferol (VITAMIN D2) 50,000 unit capsule Take 50,000 Units by mouth every seven (7) days.  sitaGLIPtin (JANUVIA) 100 mg tablet Take 100 mg by mouth daily.  sertraline (ZOLOFT) 100 mg tablet Take  by mouth daily.  diltiazem hcl 360 mg Tb24 Take  by mouth.  coenzyme q10-vitamin e (COQ10 ) 100-100 mg-unit cap Take  by mouth.  PROPANTHELINE BROMIDE (PROPANTHELINE PO) Take  by mouth.  montelukast (SINGULAIR) 10 mg tablet Take 10 mg by mouth daily.     lovastatin (MEVACOR) 40 mg tablet Take 40 mg by mouth nightly.  zolpidem (AMBIEN) 10 mg tablet Take  by mouth nightly as needed for Sleep.  insulin glargine (LANTUS) 100 unit/mL injection 10 Units by SubCUTAneous route nightly.  repaglinide (PRANDIN) 0.5 mg tablet Take 0.5 mg by mouth Before breakfast, lunch, and dinner.  albuterol (PROVENTIL VENTOLIN) 2.5 mg /3 mL (0.083 %) nebulizer solution by Nebulization route once.  fluticasone (FLONASE) 50 mcg/actuation nasal spray nightly.  azithromycin (ZITHROMAX) 250 mg tablet     anastrozole (ARIMIDEX) 1 mg tablet Take 1 mg by mouth daily.  anastrozole (ARIMIDEX) 1 mg tablet Take 1 mg by mouth daily. No current facility-administered medications for this visit. Fall Risk Assessment, last 12 mths 1/29/2020   Able to walk? Yes   Fall in past 12 months? Yes   Fall with injury? No   Number of falls in past 12 months 2   Fall Risk Score 2     3 most recent PHQ Screens 1/29/2020   Little interest or pleasure in doing things Not at all   Feeling down, depressed, irritable, or hopeless Not at all   Total Score PHQ 2 0     Abuse Screening Questionnaire 7/24/2019   Do you ever feel afraid of your partner? N   Are you in a relationship with someone who physically or mentally threatens you? N   Is it safe for you to go home? Y     Health Maintenance Due   Topic Date Due    FOOT EXAM Q1  12/09/1955    EYE EXAM RETINAL OR DILATED  12[birth year]09/1954    DTaP/Tdap/Td series (1 - Tdap) 12/09/1955    Shingrix Vaccine Age 50> (1 of 2) 12/09/1994    GLAUCOMA SCREENING Q2Y  12/09/2009    Pneumococcal 65+ years (1 of 1 - PPSV23) 12/09/2009    HEMOGLOBIN A1C Q6M  07/18/2010    MICROALBUMIN Q1  01/18/2011    LIPID PANEL Q1  01/18/2011    MEDICARE YEARLY EXAM  03/14/2018    Influenza Age 9 to Adult  08/01/2019       Medications no longer taking/discontinued: no    Patient currently taking Antiplatelet therapy? yes    1. Have you been to the ER, urgent care clinic since your last visit? Hospitalized since your last visit? no     2. Have you seen or consulted any other health care providers outside of the [address] [place] [address] since your last visit? Include any pap smears or colon screening.  no

## 2020-01-29 NOTE — PROGRESS NOTES
[address] [facility], [address]  [place], [facility]  Office Phone: [phone]  Fax: [phone]      Reason for visit:  Breast cancer    HPI:   Domingo Gregg is a 75 y.o.  female who I was asked to see in consultation at the request of Dr. Alka Beckham for evaluation for ER/WA(+), Her2(-), Stage IA (T1c N0 M0), s/p adjuvant RT, currently on adjuvant anastrozole. Patient was seen and examined today. She is awake alert oriented x3. She is tolerating well anastrozole with no side effects. Mammogram done on 7/10/2019 showed BI-RADS 2 benign. DEXA scan done on 8/14/2018 showed osteopenia. Her only complaint today is generalized aches and pains which started before she was on Arimidex. Otherwise denies any fevers, chills, shortness of breath, nausea vomiting or abdominal pain. No lumps and bumps. No focal neurologic deficit. ECOG performance status 0. Independent with ADLs and IADLs. DX:Breast cancer    STAGE: Stage IA (T1c N0 M0)    ONCOLOGY HISTORY:   6/18/18 bilateral mammo  No mammographic evidence of malignancy. Stable postsurgical and postradiation changes right breast. Recommend annual screening mammography and clinical breast examination. BI-RADS Assessment Category 2: Benign finding. 36 NM - Normal>40 Dense     8/14/2018: DEXA scan showed osteopenia.     11/04/15:Underwent lumpectomy  A: LYMPH NODE, RIGHT AXILLA, SENTINEL #1, BIOPSY:  ONE LYMPH NODE WITH NO METASTATIC CARCINOMA (0/1). AN IMMUNOPEROXIDASE STAIN FOR PAN-CYTOKERATIN ANTIGENS IS NEGATIVE. B: LYMPH NODE, RIGHT AXILLA, SENTINEL #2, BIOPSY:  TWO LYMPH NODES WITH NO METASTATIC CARCINOMA (0/2). IMMUNOPEROXIDASE STAINS FOR MUÑOZ-CYTOKERATIN ANTIGENS ARE NEGATIVE. C: BREAST, RIGHT, PARTIAL MASTECTOMY:  INFILTRATING DUCTAL CARCINOMA. SIZE OF THE INVASIVE CARCINOMA: 1.1 CM. HISTOLOGIC GRADE: II OF III. TUBULAR DIFFERENTIATION SCORE: 3.  NUCLEAR PLEOMORPHISM SCORE: 3.   MITOTIC RATE SCORE: 1. TOTAL SCORE: 7. FOCALITY: UNIFOCAL. IN SITU CARCINOMA COMPONENT: INTERMEDIATE GRADE DUCTAL  CARCINOMA IN-SITU AND LOBULAR CARCINOMA IN-SITU COMPRISING  LESS THAN 25% OF THE TUMOR. LYMPHOVASCULAR SPACE INVASION: IDENTIFIED. MARGINS OF RESECTION: NOT INVOLVED. MICROCALCIFICATIONS: IDENTIFIED IN THE TUMOR, NORMAL ACINAR  STRUCTURES AND STROMA  ESTROGEN RECEPTOR: POSITIVE ([id number]). PROGESTERONE RECEPTOR: POSITIVE ([id number]). HER2-gisela (FISH): NEGATIVE ([id number]).   AJCC STAGE: pT1c N0.    7/10/2019: Mammogram showed BI-RADS 2 benign  7/24/19: First medical oncology with me      Past Medical History:   Diagnosis Date    Asthma     Breast cancer ([facility] [address] 2015    Right lumpectomy    Breast cyst     RT - removal    Depression     Diabetes ([facility] [address]     Hypertension     Menopause     Radiation therapy complication     2 times for 5 days    Thromboembolus [facility])      Past Surgical History:   Procedure Laterality Date    HX BREAST LUMPECTOMY Right 11/4/2015    right breast lumpectomy with localization,sentinel lymph node biopsy,possible complete axillary dissection,insertion of charleen spacer  performed by Ziyad Aguilar MD at [address] [place] [address] HX CHOLECYSTECTOMY      HX CHOLECYSTECTOMY      HX KNEE REPLACEMENT Bilateral     HX KNEE REPLACEMENT Bilateral      Social History     Socioeconomic History    Marital status:      Spouse name: Not on file    Number of children: Not on file    Years of education: Not on file    Highest education level: Not on file   Tobacco Use    Smoking status: Never Smoker    Smokeless tobacco: Never Used   Substance and Sexual Activity    Alcohol use: No     Alcohol/week: 0.0 standard drinks    Drug use: No    Sexual activity: Not Currently   Other Topics Concern     Family History   Problem Relation Age of Onset    Alcohol abuse Mother     Hypertension Father     Cancer Father        Current Outpatient Medications   Medication Sig Dispense Refill    alendronate (FOSAMAX) 10 mg tablet       famotidine (PEPCID) 20 mg tablet       FREESTYLE LITE STRIPS strip       predniSONE (DELTASONE) 20 mg tablet       aspirin delayed-release 325 mg tablet Take  by mouth daily.  clobetasol (TEMOVATE) 0.05 % ointment Apply  to affected area as needed for Skin Irritation.  b complex vitamins tablet Take 1 Tab by mouth daily.  buPROPion SR (WELLBUTRIN, ZYBAN) 200 mg SR tablet Take 200 mg by mouth two (2) times a day.  losartan (COZAAR) 100 mg tablet Take 100 mg by mouth daily.  raNITIdine (ZANTAC) 150 mg tablet Take 150 mg by mouth two (2) times a day.  anastrozole (ARIMIDEX) 1 mg tablet TAKE 1 TABLET DAILY 90 Tab 2    hydroCHLOROthiazide (HYDRODIURIL) 25 mg tablet Take 25 mg by mouth daily.  QUEtiapine (SEROQUEL) 25 mg tablet Take  by mouth.  FLOVENT DISKUS 250 mcg/actuation dsdv       propranolol LA (INDERAL LA) 80 mg SR capsule       ergocalciferol (VITAMIN D2) 50,000 unit capsule Take 50,000 Units by mouth every seven (7) days.  sitaGLIPtin (JANUVIA) 100 mg tablet Take 100 mg by mouth daily.  sertraline (ZOLOFT) 100 mg tablet Take  by mouth daily.  diltiazem hcl 360 mg Tb24 Take  by mouth.  coenzyme q10-vitamin e (COQ10 ) 100-100 mg-unit cap Take  by mouth.  PROPANTHELINE BROMIDE (PROPANTHELINE PO) Take  by mouth.  montelukast (SINGULAIR) 10 mg tablet Take 10 mg by mouth daily.  lovastatin (MEVACOR) 40 mg tablet Take 40 mg by mouth nightly.  zolpidem (AMBIEN) 10 mg tablet Take  by mouth nightly as needed for Sleep.  insulin glargine (LANTUS) 100 unit/mL injection 10 Units by SubCUTAneous route nightly.  repaglinide (PRANDIN) 0.5 mg tablet Take 0.5 mg by mouth Before breakfast, lunch, and dinner.  albuterol (PROVENTIL VENTOLIN) 2.5 mg /3 mL (0.083 %) nebulizer solution by Nebulization route once.  fluticasone (FLONASE) 50 mcg/actuation nasal spray nightly.       azithromycin (ZITHROMAX) 250 mg tablet       anastrozole (ARIMIDEX) 1 mg tablet Take 1 mg by mouth daily. 30 Tab 11    anastrozole (ARIMIDEX) 1 mg tablet Take 1 mg by mouth daily. 90 Tab 1       Allergies   Allergen Reactions    Sulfites Cough     SULFITES IS IN PLAVIX       Review of Systems  As per HPI    Objective:  Physical Exam:  There were no vitals taken for this visit. General:  Alert, cooperative, no distress, appears stated age. Head:  Normocephalic, without obvious abnormality, atraumatic. Eyes:  Conjunctivae/corneas clear. PERRL, EOMs intact. Throat: Lips, mucosa, and tongue normal.    Neck: Supple, symmetrical, trachea midline, no adenopathy, thyroid: no enlargement/tenderness/nodules   Back:   Symmetric, no curvature. ROM normal. No CVA tenderness. Lungs:   Clear to auscultation bilaterally. Chest wall:  No tenderness or deformity. Heart:  Regular rate and rhythm, S1, S2 normal, no murmur, click, rub or gallop. Breast Exam:  No tenderness, masses, or nipple abnormality. Right surgical site well healed, no evidence of recurrence. Palpable scar tissue   Abdomen:   Soft, non-tender. Bowel sounds normal. No masses,  No organomegaly. Extremities: Extremities normal, atraumatic, no cyanosis or edema. Skin: Skin color, texture, turgor normal. No rashes or lesions. Lymph nodes: Cervical, supraclavicular, and axillary nodes normal.   Neurologic: CNII-XII intact. Diagnostic Imaging     No results found for this or any previous visit.     Lab Results  Lab Results   Component Value Date/Time    WBC 7.9 07/26/2019 02:27 PM    HGB 13.6 07/26/2019 02:27 PM    HCT 43.0 07/26/2019 02:27 PM    PLATELET 168 42/66/2203 02:27 PM    MCV 95.3 07/26/2019 02:27 PM       Lab Results   Component Value Date/Time    Sodium 140 07/26/2019 02:27 PM    Potassium 4.0 07/26/2019 02:27 PM    Chloride 104 07/26/2019 02:27 PM    CO2 33 (H) 07/26/2019 02:27 PM    Anion gap 3 07/26/2019 02:27 PM    Glucose 168 (H) 07/26/2019 02:27 PM    BUN 30 (H) 07/26/2019 02:27 PM    Creatinine 1.17 07/26/2019 02:27 PM    BUN/Creatinine ratio 26 (H) 07/26/2019 02:27 PM    GFR est AA 55 (L) 07/26/2019 02:27 PM    GFR est non-AA 45 (L) 07/26/2019 02:27 PM    Calcium 9.7 07/26/2019 02:27 PM    AST (SGOT) 14 07/26/2019 02:27 PM    Alk. phosphatase 96 07/26/2019 02:27 PM    Protein, total 7.1 07/26/2019 02:27 PM    Albumin 3.6 07/26/2019 02:27 PM    Globulin 3.5 07/26/2019 02:27 PM    A-G Ratio 1.0 07/26/2019 02:27 PM    ALT (SGPT) 22 07/26/2019 02:27 PM       Assessment/Plan:  75 y.o. female  1. Breast cancer: Patient with stage Ia (T1c N0 M0) breast cancer, currently on adjuvant Arimidex, here for follow-up. *Continue Arimidex for 5-10years  *Diet exercise and weight loss program  *Check baseline labs with CBC and CMP  *Next DEXA scan is due in August 2020  *Consider Fosamax or Prolia  *Next mammogram due in July 2020  *F/U with surgery    2. Osteopenia:  *Continue vitamin D  *Says she is on  Fosamax currently. *Weightbearing exercise    3.   Morbid obesity: Diet exercise and weight loss program      Return in 6 months

## 2020-01-30 ENCOUNTER — TELEPHONE (OUTPATIENT)
Dept: ONCOLOGY | Age: 75
End: 2020-01-30

## 2020-01-30 NOTE — TELEPHONE ENCOUNTER
Patient transferred to me by Bishop Pichardo. Patient says she spoke with Dr. Robyn Mayorga yesterday regarding her Fosamax prescription and states she is currently taking this medication. She requested a refill, however she currently has 3 refills on this prescription. She states she will call [facility] and let us know if they need a new prescription.

## 2020-01-30 NOTE — TELEPHONE ENCOUNTER
Patient called requesting to speak with nurse regarding medication. States the provider had some questions about her medicine and instructed patient to call office and give info to nurse.  Please f/u

## 2020-07-15 ENCOUNTER — VIRTUAL VISIT (OUTPATIENT)
Dept: ONCOLOGY | Age: 75
End: 2020-07-15

## 2020-07-15 DIAGNOSIS — C50.511 MALIGNANT NEOPLASM OF LOWER-OUTER QUADRANT OF RIGHT BREAST OF FEMALE, ESTROGEN RECEPTOR POSITIVE (HCC): Primary | ICD-10-CM

## 2020-07-15 DIAGNOSIS — Z17.0 MALIGNANT NEOPLASM OF LOWER-OUTER QUADRANT OF RIGHT BREAST OF FEMALE, ESTROGEN RECEPTOR POSITIVE (HCC): Primary | ICD-10-CM

## 2020-07-15 DIAGNOSIS — M85.80 OSTEOPENIA, UNSPECIFIED LOCATION: ICD-10-CM

## 2020-07-15 RX ORDER — OLMESARTAN MEDOXOMIL 40 MG/1
TABLET ORAL
COMMUNITY
Start: 2020-06-15

## 2020-07-15 RX ORDER — BUDESONIDE AND FORMOTEROL FUMARATE DIHYDRATE 160; 4.5 UG/1; UG/1
AEROSOL RESPIRATORY (INHALATION)
COMMUNITY
Start: 2020-05-18

## 2020-07-15 NOTE — PROGRESS NOTES
Mary Galeana is a 75 y.o. female who was seen by synchronous (real-time) audio-technology on 7/15/2020. Assessment & Plan:   Diagnoses and all orders for this visit:    1. Malignant neoplasm of lower-outer quadrant of right breast of female, estrogen receptor positive ([facility] [address]    2. Osteopenia, unspecified location        The complexity of medical decision making for this visit is moderate       1. Breast cancer: Patient with stage Ia (T1c N0 M0) breast cancer, currently on adjuvant Arimidex, here for follow-up  Mammogram on 7/10/2020 was negative, BI-RADS 2. She is tolerating well anastrozole with no side effects. *Continue Arimidex for 5-10years  *Diet exercise and weight loss program  *Check baseline labs with CBC and CMP  *Next DEXA scan is due in August 2020  *Next mammogram due in July 2021  *F/U with surgery     2. Osteopenia:  *Continue vitamin D  *Says she is on  Fosamax currently. *Weightbearing exercise     3. Morbid obesity: Diet exercise and weight loss program    RTC 6 months     I spent at least 20 minutes on this visit with this established patient. [id number]  Subjective:   Mary Galeana is a 75 y.o.  female who I was asked to see in consultation at the request of Dr. Geneva Foss for evaluation for ER/MI(+), Her2(-), Stage IA (T1c N0 M0), s/p adjuvant RT, currently on adjuvant anastrozole.      Patient was seen and examined today. She is awake alert oriented x3. She is tolerating well anastrozole with no side effects. Mammogram done on 7/10/2019 showed BI-RADS 2 benign. DEXA scan done on 8/14/2018 showed osteopenia. Her only complaint today is still generalized aches and pains which started before she was on Arimidex. Otherwise denies any fevers, chills, shortness of breath, nausea vomiting or abdominal pain. No lumps and bumps. No focal neurologic deficit. ECOG performance status 0. Independent with ADLs and IADLs.         DX:Breast cancer     STAGE: Stage IA (T1c N0 M0)     ONCOLOGY HISTORY:   6/18/18 bilateral mammo  No mammographic evidence of malignancy. Stable postsurgical and postradiation changes right breast. Recommend annual screening mammography and clinical breast examination. BI-RADS Assessment Category 2: Benign finding. 36 NM - Normal>40 Dense      8/14/2018: DEXA scan showed osteopenia.     11/04/15:Underwent lumpectomy  A: LYMPH NODE, RIGHT AXILLA, SENTINEL #1, BIOPSY:  ONE LYMPH NODE WITH NO METASTATIC CARCINOMA (0/1). AN IMMUNOPEROXIDASE STAIN FOR PAN-CYTOKERATIN ANTIGENS IS NEGATIVE. B: LYMPH NODE, RIGHT AXILLA, SENTINEL #2, BIOPSY:  TWO LYMPH NODES WITH NO METASTATIC CARCINOMA (0/2). IMMUNOPEROXIDASE STAINS FOR MUÑOZ-CYTOKERATIN ANTIGENS ARE NEGATIVE. C: BREAST, RIGHT, PARTIAL MASTECTOMY:  INFILTRATING DUCTAL CARCINOMA. SIZE OF THE INVASIVE CARCINOMA: 1.1 CM. HISTOLOGIC GRADE: II OF III. TUBULAR DIFFERENTIATION SCORE: 3.  NUCLEAR PLEOMORPHISM SCORE: 3. MITOTIC RATE SCORE: 1. TOTAL SCORE: 7. FOCALITY: UNIFOCAL. IN SITU CARCINOMA COMPONENT: INTERMEDIATE GRADE DUCTAL  CARCINOMA IN-SITU AND LOBULAR CARCINOMA IN-SITU COMPRISING  LESS THAN 25% OF THE TUMOR. LYMPHOVASCULAR SPACE INVASION: IDENTIFIED. MARGINS OF RESECTION: NOT INVOLVED. MICROCALCIFICATIONS: IDENTIFIED IN THE TUMOR, NORMAL ACINAR  STRUCTURES AND STROMA  ESTROGEN RECEPTOR: POSITIVE ([id number]). PROGESTERONE RECEPTOR: POSITIVE ([id number]). HER2-gisela (FISH): NEGATIVE ([id number]). AJCC STAGE: pT1c N0.     7/10/2019: Mammogram showed BI-RADS 2 benign  7/24/19: First medical oncology with me  7/10/20: Mammogram negative-Birads 2          Prior to Admission medications    Medication Sig Start Date End Date Taking?  Authorizing Provider   alendronate (FOSAMAX) 10 mg tablet  12/30/19   Provider, Historical   azithromycin (ZITHROMAX) 250 mg tablet  1/7/20   Provider, Historical   famotidine (PEPCID) 20 mg tablet  11/6/19   Provider, Historical   FREESTYLE LITE STRIPS strip  11/12/19   Provider, Historical predniSONE (DELTASONE) 20 mg tablet  1/7/20   Provider, Historical   anastrozole (ARIMIDEX) 1 mg tablet Take 1 mg by mouth daily. 7/24/19   Krystle Byrne MD   aspirin delayed-release 325 mg tablet Take  by mouth daily. Provider, Historical   clobetasol (TEMOVATE) 0.05 % ointment Apply  to affected area as needed for Skin Irritation. Provider, Historical   b complex vitamins tablet Take 1 Tab by mouth daily. Provider, Historical   buPROPion SR (WELLBUTRIN, ZYBAN) 200 mg SR tablet Take 200 mg by mouth two (2) times a day. Provider, Historical   losartan (COZAAR) 100 mg tablet Take 100 mg by mouth daily. Provider, Historical   anastrozole (ARIMIDEX) 1 mg tablet Take 1 mg by mouth daily. 4/1/19   Xenia Otoole MD   raNITIdine (ZANTAC) 150 mg tablet Take 150 mg by mouth two (2) times a day. Provider, Historical   anastrozole (ARIMIDEX) 1 mg tablet TAKE 1 TABLET DAILY 1/23/18   Mechelle Masters MD   hydroCHLOROthiazide (HYDRODIURIL) 25 mg tablet Take 25 mg by mouth daily. Provider, Historical   QUEtiapine (SEROQUEL) 25 mg tablet Take  by mouth. Provider, Historical   FLOVENT DISKUS 250 mcg/actuation dsdv  1/5/17   Provider, Historical   propranolol LA (INDERAL LA) 80 mg SR capsule  8/19/16   Provider, Historical   ergocalciferol (VITAMIN D2) 50,000 unit capsule Take 50,000 Units by mouth every seven (7) days. Provider, Historical   sitaGLIPtin (JANUVIA) 100 mg tablet Take 100 mg by mouth daily. Provider, Historical   sertraline (ZOLOFT) 100 mg tablet Take  by mouth daily. Provider, Historical   diltiazem hcl 360 mg Tb24 Take  by mouth. Provider, Historical   coenzyme q10-vitamin e (COQ10 ) 100-100 mg-unit cap Take  by mouth. Provider, Historical   PROPANTHELINE BROMIDE (PROPANTHELINE PO) Take  by mouth. Provider, Historical   montelukast (SINGULAIR) 10 mg tablet Take 10 mg by mouth daily.     Provider, Historical   lovastatin (MEVACOR) 40 mg tablet Take 40 mg by mouth nightly. Provider, Historical   zolpidem (AMBIEN) 10 mg tablet Take  by mouth nightly as needed for Sleep. Provider, Historical   insulin glargine (LANTUS) 100 unit/mL injection 10 Units by SubCUTAneous route nightly. Provider, Historical   repaglinide (PRANDIN) 0.5 mg tablet Take 0.5 mg by mouth Before breakfast, lunch, and dinner. Provider, Historical   albuterol (PROVENTIL VENTOLIN) 2.5 mg /3 mL (0.083 %) nebulizer solution by Nebulization route once. Provider, Historical   fluticasone (FLONASE) 50 mcg/actuation nasal spray nightly. Provider, Historical     Patient Active Problem List   Diagnosis Code    Mass of right breast on mammogram And ultrasound-6:00,  6 cm from nipple N63.10    Asthma, endogenous J45.909    Hypertension, essential I10    Diabetes mellitus type 2, uncomplicated ([address] Q39.5    Breast cancer, right ([address] C50.911    Obesity, morbid ([address] E66.01    Osteopenia M85.80     Patient Active Problem List    Diagnosis Date Noted    Osteopenia 01/29/2020    Obesity, morbid ([address] 01/23/2018    Breast cancer, right ([address] 11/04/2015    Mass of right breast on mammogram And ultrasound-6:00,  6 cm from nipple 10/02/2015    Asthma, endogenous 10/02/2015    Hypertension, essential 10/02/2015    Diabetes mellitus type 2, uncomplicated ([address] 81/62/5572     Current Outpatient Medications   Medication Sig Dispense Refill    alendronate (FOSAMAX) 10 mg tablet       azithromycin (ZITHROMAX) 250 mg tablet       famotidine (PEPCID) 20 mg tablet       FREESTYLE LITE STRIPS strip       predniSONE (DELTASONE) 20 mg tablet       anastrozole (ARIMIDEX) 1 mg tablet Take 1 mg by mouth daily. 30 Tab 11    aspirin delayed-release 325 mg tablet Take  by mouth daily.  clobetasol (TEMOVATE) 0.05 % ointment Apply  to affected area as needed for Skin Irritation.  b complex vitamins tablet Take 1 Tab by mouth daily.       buPROPion SR (WELLBUTRIN, ZYBAN) 200 mg SR tablet Take 200 mg by mouth two (2) times a day.  losartan (COZAAR) 100 mg tablet Take 100 mg by mouth daily.  anastrozole (ARIMIDEX) 1 mg tablet Take 1 mg by mouth daily. 90 Tab 1    raNITIdine (ZANTAC) 150 mg tablet Take 150 mg by mouth two (2) times a day.  anastrozole (ARIMIDEX) 1 mg tablet TAKE 1 TABLET DAILY 90 Tab 2    hydroCHLOROthiazide (HYDRODIURIL) 25 mg tablet Take 25 mg by mouth daily.  QUEtiapine (SEROQUEL) 25 mg tablet Take  by mouth.  FLOVENT DISKUS 250 mcg/actuation dsdv       propranolol LA (INDERAL LA) 80 mg SR capsule       ergocalciferol (VITAMIN D2) 50,000 unit capsule Take 50,000 Units by mouth every seven (7) days.  sitaGLIPtin (JANUVIA) 100 mg tablet Take 100 mg by mouth daily.  sertraline (ZOLOFT) 100 mg tablet Take  by mouth daily.  diltiazem hcl 360 mg Tb24 Take  by mouth.  coenzyme q10-vitamin e (COQ10 ) 100-100 mg-unit cap Take  by mouth.  PROPANTHELINE BROMIDE (PROPANTHELINE PO) Take  by mouth.  montelukast (SINGULAIR) 10 mg tablet Take 10 mg by mouth daily.  lovastatin (MEVACOR) 40 mg tablet Take 40 mg by mouth nightly.  zolpidem (AMBIEN) 10 mg tablet Take  by mouth nightly as needed for Sleep.  insulin glargine (LANTUS) 100 unit/mL injection 10 Units by SubCUTAneous route nightly.  repaglinide (PRANDIN) 0.5 mg tablet Take 0.5 mg by mouth Before breakfast, lunch, and dinner.  albuterol (PROVENTIL VENTOLIN) 2.5 mg /3 mL (0.083 %) nebulizer solution by Nebulization route once.  fluticasone (FLONASE) 50 mcg/actuation nasal spray nightly.        Allergies   Allergen Reactions    Sulfites Cough     SULFITES IS IN PLAVIX     Past Medical History:   Diagnosis Date    Asthma     [facility]    Right lumpectomy    Breast cyst     RT - removal    Depression     Diabetes ([facility]     Hypertension     Menopause     Radiation therapy complication     2 times for 5 days    Throm[facility]embolus ([facility] Past Surgical History:   Procedure Laterality Date    HX BREAST LUMPECTOMY Right 11/4/2015    right breast lumpectomy with localization,sentinel lymph node biopsy,possible complete axillary dissection,insertion of charleen spacer  performed by Sha [facility] at [facility] MA[facility] OR    HX CHOLECYSTECTOMY      HX CHOLECYSTECTOMY      HX KNEE REPLACEMENT Bilateral     HX KNEE REPLACEMENT Bilateral      Family History   Problem Relation Age of Onset    Alcohol abuse Mother     Hypertension Father     Cancer Father      Social History     Tobacco Use    Smoking status: Never Smoker    Smokeless tobacco: Never Used   Substance Use Topics    Alcohol use: No     Alcohol/week: 0.0 standard drinks       ROS:As per HPi    Objective:   No flowsheet data found. Additional exam findings: We discussed the expected course, resolution and complications of the diagnosis(es) in detail. Medication risks, benefits, costs, interactions, and alternatives were discussed as indicated. I advised her to contact the office if her condition worsens, changes or fails to improve as anticipated. She expressed understanding with the diagnosis(es) and plan. Hemant Hough, who was evaluated through a patient-initiated, synchronous (real-time) audio- encounter, and/or her healthcare decision maker, is aware that it is a billable service, with coverage as determined by her insurance carrier. She provided verbal consent to proceed: Yes, and patient identification was verified. It was conducted pursuant to the emergency declaration under the [facility], [address] [place] [address] authority and the [employer] Act. A caregiver was present when appropriate. Ability to conduct physical exam was limited. I was at home. The patient was at home.       Aleja Mendiola MD

## 2020-07-31 ENCOUNTER — TELEPHONE (OUTPATIENT)
Dept: SURGERY | Age: 75
End: 2020-07-31

## 2020-07-31 DIAGNOSIS — C50.511 MALIGNANT NEOPLASM OF LOWER-OUTER QUADRANT OF RIGHT BREAST OF FEMALE, ESTROGEN RECEPTOR POSITIVE (HCC): Primary | ICD-10-CM

## 2020-07-31 DIAGNOSIS — Z17.0 MALIGNANT NEOPLASM OF LOWER-OUTER QUADRANT OF RIGHT BREAST OF FEMALE, ESTROGEN RECEPTOR POSITIVE (HCC): Primary | ICD-10-CM

## 2020-09-02 ENCOUNTER — HOSPITAL ENCOUNTER (OUTPATIENT)
Dept: MAMMOGRAPHY | Age: 75
Discharge: HOME | End: 2020-09-02
Attending: SURGERY
Payer: MEDICARE

## 2020-09-02 DIAGNOSIS — Z17.0 MALIGNANT NEOPLASM OF LOWER-OUTER QUADRANT OF RIGHT BREAST OF FEMALE, ESTROGEN RECEPTOR POSITIVE (HCC): ICD-10-CM

## 2020-09-02 DIAGNOSIS — C50.511 MALIGNANT NEOPLASM OF LOWER-OUTER QUADRANT OF RIGHT BREAST OF FEMALE, ESTROGEN RECEPTOR POSITIVE (HCC): ICD-10-CM

## 2020-09-02 PROCEDURE — 77062 BREAST TOMOSYNTHESIS BI: CPT

## 2020-09-10 ENCOUNTER — TELEPHONE (OUTPATIENT)
Dept: SURGERY | Age: 75
End: 2020-09-10

## 2020-09-10 NOTE — TELEPHONE ENCOUNTER
Called patient per Dr. Kamilla Delgado to let her know there were no areas of concern seen on imaging. Appt arranged for clinical breast exam. Patient verbalized understanding.    ----- Message from Courtney Barrios MD sent at 9/2/2020  9:57 AM EDT -----  Please let her know there were no areas of concern seen on imaging.   Thanks    ----- Message -----  From: Deangelo, Rad Results In  Sent: 9/2/2020   9:44 AM EDT  To: Courtney Barrios MD

## 2020-09-15 ENCOUNTER — OFFICE VISIT (OUTPATIENT)
Dept: ONCOLOGY | Age: 75
End: 2020-09-15

## 2020-09-15 VITALS
OXYGEN SATURATION: 93 % | WEIGHT: 231.6 LBS | SYSTOLIC BLOOD PRESSURE: 130 MMHG | HEART RATE: 66 BPM | TEMPERATURE: 98 F | BODY MASS INDEX: 42.36 KG/M2 | RESPIRATION RATE: 18 BRPM | DIASTOLIC BLOOD PRESSURE: 55 MMHG

## 2020-09-15 DIAGNOSIS — M85.80 OSTEOPENIA, UNSPECIFIED LOCATION: Primary | ICD-10-CM

## 2020-09-15 DIAGNOSIS — C50.511 MALIGNANT NEOPLASM OF LOWER-OUTER QUADRANT OF RIGHT BREAST OF FEMALE, ESTROGEN RECEPTOR POSITIVE (HCC): ICD-10-CM

## 2020-09-15 DIAGNOSIS — Z17.0 MALIGNANT NEOPLASM OF LOWER-OUTER QUADRANT OF RIGHT BREAST OF FEMALE, ESTROGEN RECEPTOR POSITIVE (HCC): ICD-10-CM

## 2020-09-15 RX ORDER — ANASTROZOLE 1 MG/1
1 TABLET ORAL DAILY
Qty: 90 TAB | Refills: 3 | Status: SHIPPED | OUTPATIENT
Start: 2020-09-15 | End: 2021-09-27

## 2020-09-15 NOTE — PATIENT INSTRUCTIONS
Anastrozole (By mouth) Anastrozole (an-AS-troe-zole) Treats breast cancer. Brand Name(s): Arimidex There may be other brand names for this medicine. When This Medicine Should Not Be Used: This medicine is not right for everyone. Do not use it if you had an allergic reaction to anastrozole, if you are pregnant, or if you have not stopped menstruating (premenopausal). How to Use This Medicine:  
Tablet · Medicines used to treat cancer are very strong and can have many side effects. Before receiving this medicine, make sure you understand all the risks and benefits. It is important for you to work closely with your doctor during your treatment. · Your doctor will tell you how much medicine to use. Do not use more than directed. · Read and follow the patient instructions that come with this medicine. Talk to your doctor or pharmacist if you have any questions. · Missed dose: Take a dose as soon as you remember. If it is almost time for your next dose, wait until then and take a regular dose. Do not take extra medicine to make up for a missed dose. · If you vomit after taking your medicine, call your doctor or pharmacist for instructions. · Store the medicine in a closed container at room temperature, away from heat, moisture, and direct light. · Ask your pharmacist, doctor, or health caregiver about the best way to dispose of any leftover medicine after you have finished your treatment. You will also need to throw away old medicine after the expiration date has passed. Drugs and Foods to Avoid: Ask your doctor or pharmacist before using any other medicine, including over-the-counter medicines, vitamins, and herbal products. · Some medicines can affect how anastrozole works. Tell your doctor if you are taking any of the following: ¨ Medicine that contains estrogen ¨ Tamoxifen Warnings While Using This Medicine: · Pregnancy after menopause is not likely, but if you think you could be pregnant, tell your doctor. This medicine could harm an unborn baby. · Tell your doctor if you are breastfeeding, or if you have liver disease, bone problems (such as osteoporosis), high cholesterol, or heart disease. · This medicine may cause the following problems:  
¨ Increased risk for weak bones or osteoporosis ¨ Increased cholesterol levels ¨ Increased risk for heart attack or stroke · Your doctor will do lab tests at regular visits to check on the effects of this medicine. Keep all appointments. · Cancer medicine can cause nausea or vomiting, sometimes even after you receive medicine to prevent these effects. Ask your doctor or nurse about other ways to control any nausea or vomiting that might happen. · Keep all medicine out of the reach of children. Never share your medicine with anyone. Possible Side Effects While Using This Medicine:  
Call your doctor right away if you notice any of these side effects: · Allergic reaction: Itching or hives, swelling in your face or hands, swelling or tingling in your mouth or throat, chest tightness, trouble breathing · Back, bone, joint, or muscle pain · Blistering, peeling, or red skin rash · Chest pain or shortness of breath · Fever, chills, cough, sore throat, and body aches · Numbness, tingling, or burning pain in your hands, arms, legs, or feet · Rapid weight gain, or swelling in your hands, ankles, or feet · Severe diarrhea, nausea, or vomiting · Vaginal bleeding or discharge · Yellowing of your skin or the whites of your eyes If you notice these less serious side effects, talk with your doctor: · Breast pain · Dizziness, headache, tiredness, or weakness · Mood changes, anxiety, or irritability · Trouble sleeping · Warmth or redness in your face, neck, arms, or upper chest 
If you notice other side effects that you think are caused by this medicine, tell your doctor. Call your doctor for medical advice about side effects.  You may report side effects to FDA at 9-288-FDA-5454 © 2017 [facility] Information is for End User's use only and may not be sold, redistributed or otherwise used for commercial purposes. The above information is an  only. It is not intended as medical advice for individual conditions or treatments. Talk to your doctor, nurse or pharmacist before following any medical regimen to see if it is safe and effective for you.

## 2020-09-15 NOTE — PROGRESS NOTES
Patricia Galindo is a 75 y.o. female presenting today for a follow-up appointment. Patient is ambulatory with no assistive devices and denies any complaints. Chief Complaint   Patient presents with    Breast Cancer       Visit Vitals  BP (!) 130/55   Pulse 66   Temp 98 °F (36.7 °C) (Oral)   Resp 18   Wt 231 lb 9.6 oz (105.1 kg)   SpO2 93%   BMI 42.36 kg/m²       Current Outpatient Medications   Medication Sig    Symbicort 160-4.5 mcg/actuation HFAA     olmesartan (BENICAR) 40 mg tablet     alendronate (FOSAMAX) 10 mg tablet     famotidine (PEPCID) 20 mg tablet     FREESTYLE LITE STRIPS strip     predniSONE (DELTASONE) 20 mg tablet     anastrozole (ARIMIDEX) 1 mg tablet Take 1 mg by mouth daily.  aspirin delayed-release 325 mg tablet Take  by mouth daily.  clobetasol (TEMOVATE) 0.05 % ointment Apply  to affected area as needed for Skin Irritation.  b complex vitamins tablet Take 1 Tab by mouth daily.  buPROPion SR (WELLBUTRIN, ZYBAN) 200 mg SR tablet Take 200 mg by mouth two (2) times a day.  losartan (COZAAR) 100 mg tablet Take 100 mg by mouth daily.  anastrozole (ARIMIDEX) 1 mg tablet Take 1 mg by mouth daily.  raNITIdine (ZANTAC) 150 mg tablet Take 150 mg by mouth two (2) times a day.  anastrozole (ARIMIDEX) 1 mg tablet TAKE 1 TABLET DAILY    hydroCHLOROthiazide (HYDRODIURIL) 25 mg tablet Take 25 mg by mouth daily.  QUEtiapine (SEROQUEL) 25 mg tablet Take  by mouth.  FLOVENT DISKUS 250 mcg/actuation dsdv     propranolol LA (INDERAL LA) 80 mg SR capsule     ergocalciferol (VITAMIN D2) 50,000 unit capsule Take 50,000 Units by mouth every seven (7) days.  sitaGLIPtin (JANUVIA) 100 mg tablet Take 100 mg by mouth daily.  sertraline (ZOLOFT) 100 mg tablet Take  by mouth daily.  diltiazem hcl 360 mg Tb24 Take  by mouth.  coenzyme q10-vitamin e (COQ10 ) 100-100 mg-unit cap Take  by mouth.  PROPANTHELINE BROMIDE (PROPANTHELINE PO) Take  by mouth.     montelukast (SINGULAIR) 10 mg tablet Take 10 mg by mouth daily.  lovastatin (MEVACOR) 40 mg tablet Take 40 mg by mouth nightly.  zolpidem (AMBIEN) 10 mg tablet Take  by mouth nightly as needed for Sleep.  insulin glargine (LANTUS) 100 unit/mL injection 10 Units by SubCUTAneous route nightly.  repaglinide (PRANDIN) 0.5 mg tablet Take 0.5 mg by mouth Before breakfast, lunch, and dinner.  albuterol (PROVENTIL VENTOLIN) 2.5 mg /3 mL (0.083 %) nebulizer solution by Nebulization route once.  fluticasone (FLONASE) 50 mcg/actuation nasal spray nightly. No current facility-administered medications for this visit. Fall Risk Assessment, last 12 mths 1/29/2020   Able to walk? Yes   Fall in past 12 months? Yes   Fall with injury? No   Number of falls in past 12 months 2   Fall Risk Score 2       3 most recent PHQ Screens 1/29/2020   Little interest or pleasure in doing things Not at all   Feeling down, depressed, irritable, or hopeless Not at all   Total Score PHQ 2 0       Abuse Screening Questionnaire 7/24/2019   Do you ever feel afraid of your partner? N   Are you in a relationship with someone who physically or mentally threatens you? N   Is it safe for you to go home? Y       1. Have you been to the ER, urgent care clinic since your last visit? Hospitalized since your last visit? No    2. Have you seen or consulted any other health care providers outside of the [address] [place] [address] since your last visit? Include any pap smears or colon screening.  No

## 2020-09-15 NOTE — PROGRESS NOTES
Hematology/Medical Oncology Progress Note  Name: Jennifer Magallanes  Date: 09/15/2020  : 1945    Rohith Mccormack MD     Jennifer Magallanes is a 75 y.o.  female who was seen today for management of her ER/SC(+), Her2(-), Stage IA (T1c N0 M0), s/p adjuvant RT    DX: Breast cancer  STAGE: Stage IA (T1c N0 M0)    Currently on adjuvant anastrozole 1mg PO daily. Subjective      Jennifer Magallanes Is a 75 y.o.  female who was seen and examined today for management of her ER/SC(+), Her2(-), Stage IA (T1c N0 M0), s/p adjuvant RT  She is awake alert oriented x3. She reports she is tolerating anastrozole well with no side effects. Mammogram done on 2020 showed BI-RADS 2 benign. DEXA scan done on 2018 showed osteopenia. She states she will call her PCP to get an order for DEXA due this year. She denies any fevers, chills, shortness of breath, nausea vomiting or abdominal pain.  No lumps and bumps.  No focal neurologic deficit.  ECOG performance status 0.  Independent with ADLs and IADLs. ONCOLOGY HISTORY:   18 bilateral mammo  No mammographic evidence of malignancy. Stable postsurgical and postradiation changes right breast. Recommend annual screening mammography and clinical breast examination. BI-RADS Assessment Category 2: Benign finding. 36 NM - Normal>40 Dense     2018: DEXA scan showed osteopenia.     11/04/15:Underwent lumpectomy  A: LYMPH NODE, RIGHT AXILLA, SENTINEL #1, BIOPSY:  ONE LYMPH NODE WITH NO METASTATIC CARCINOMA (0/1). AN IMMUNOPEROXIDASE STAIN FOR PAN-CYTOKERATIN ANTIGENS IS NEGATIVE. B: LYMPH NODE, RIGHT AXILLA, SENTINEL #2, BIOPSY:  TWO LYMPH NODES WITH NO METASTATIC CARCINOMA (0/2). IMMUNOPEROXIDASE STAINS FOR MUÑOZ-CYTOKERATIN ANTIGENS ARE NEGATIVE. C: BREAST, RIGHT, PARTIAL MASTECTOMY:  INFILTRATING DUCTAL CARCINOMA. SIZE OF THE INVASIVE CARCINOMA: 1.1 CM. HISTOLOGIC GRADE: II OF III.   TUBULAR DIFFERENTIATION SCORE: 3.  NUCLEAR PLEOMORPHISM SCORE: 3. MITOTIC RATE SCORE: 1. TOTAL SCORE: 7. FOCALITY: UNIFOCAL. IN SITU CARCINOMA COMPONENT: INTERMEDIATE GRADE DUCTAL  CARCINOMA IN-SITU AND LOBULAR CARCINOMA IN-SITU COMPRISING  LESS THAN 25% OF THE TUMOR. LYMPHOVASCULAR SPACE INVASION: IDENTIFIED. MARGINS OF RESECTION: NOT INVOLVED. MICROCALCIFICATIONS: IDENTIFIED IN THE TUMOR, NORMAL ACINAR  STRUCTURES AND STROMA  ESTROGEN RECEPTOR: POSITIVE ([id number]). PROGESTERONE RECEPTOR: POSITIVE ([id number]). HER2-gisela (FISH): NEGATIVE ([id number]). AJCC STAGE: pT1c N0.    07/10/2019: Mammogram showed BI-RADS 2 benign  07/24/2019: First medical oncology with me  09/02/2020: Bilateral Screening Mammogram showed no mammographic evidence of malignancy. Annual screening mammography  and clinical breast examination were recommended    Past medical history, family history, and social history: these were reviewed and remain unchanged.     Past Medical History:   Diagnosis Date    Asthma     Breast cancer ([facility] [address] 2015    Right lumpectomy    Breast cyst     RT - removal    Depression     Diabetes ([facility] [address] [facility]     Hypertension     Menopause     Radiation therapy complication     2 times for 5 days    [facility]romboembolus [facility])      Past Surgical History:   Procedure Laterality Date    HX BREAST LUMPECTOMY Right 11/4/2015    right breast lumpectomy with localization,sentinel lymph node biopsy,possible complete axillary dissection,insertion of charleen spacer  [facility] [address] HX CHOLECYSTECTOMY      HX CHOLECYSTECTOMY      HX KNEE REPLACEMENT Bilateral     HX KNEE REPLACEMENT Bilateral      Social History     Socioeconomic History    Marital status:      Spouse name: Not on file    Number of children: Not on file    Years of education: Not on file    Highest education level: Not on file   Tobacco Use    Smoking status: Never Smoker    Smokeless tobacco: Never Used   Substance and Sexual Activity    Alcohol use: No     Alcohol/week: 0.0 standard drinks    Drug use: No    Sexual activity: Not Currently   Other Topics Concern     Family History   Problem Relation Age of Onset    Alcohol abuse Mother     Hypertension Father     Cancer Father        Current Outpatient Medications   Medication Sig Dispense Refill    Symbicort 160-4.5 mcg/actuation HFAA       olmesartan (BENICAR) 40 mg tablet       alendronate (FOSAMAX) 10 mg tablet       famotidine (PEPCID) 20 mg tablet       FREESTYLE LITE STRIPS strip       predniSONE (DELTASONE) 20 mg tablet       aspirin delayed-release 325 mg tablet Take  by mouth daily.  clobetasol (TEMOVATE) 0.05 % ointment Apply  to affected area as needed for Skin Irritation.  b complex vitamins tablet Take 1 Tab by mouth daily.  buPROPion SR (WELLBUTRIN, ZYBAN) 200 mg SR tablet Take 200 mg by mouth two (2) times a day.  losartan (COZAAR) 100 mg tablet Take 100 mg by mouth daily.  raNITIdine (ZANTAC) 150 mg tablet Take 150 mg by mouth two (2) times a day.  hydroCHLOROthiazide (HYDRODIURIL) 25 mg tablet Take 25 mg by mouth daily.  QUEtiapine (SEROQUEL) 25 mg tablet Take  by mouth.  FLOVENT DISKUS 250 mcg/actuation dsdv       propranolol LA (INDERAL LA) 80 mg SR capsule       ergocalciferol (VITAMIN D2) 50,000 unit capsule Take 50,000 Units by mouth every seven (7) days.  sitaGLIPtin (JANUVIA) 100 mg tablet Take 100 mg by mouth daily.  sertraline (ZOLOFT) 100 mg tablet Take  by mouth daily.  diltiazem hcl 360 mg Tb24 Take  by mouth.  coenzyme q10-vitamin e (COQ10 ) 100-100 mg-unit cap Take  by mouth.  PROPANTHELINE BROMIDE (PROPANTHELINE PO) Take  by mouth.  montelukast (SINGULAIR) 10 mg tablet Take 10 mg by mouth daily.  lovastatin (MEVACOR) 40 mg tablet Take 40 mg by mouth nightly.  zolpidem (AMBIEN) 10 mg tablet Take  by mouth nightly as needed for Sleep.       insulin glargine (LANTUS) 100 unit/mL injection 10 Units by SubCUTAneous route nightly.  repaglinide (PRANDIN) 0.5 mg tablet Take 0.5 mg by mouth Before breakfast, lunch, and dinner.  albuterol (PROVENTIL VENTOLIN) 2.5 mg /3 mL (0.083 %) nebulizer solution by Nebulization route once.  fluticasone (FLONASE) 50 mcg/actuation nasal spray nightly.  anastrozole (ARIMIDEX) 1 mg tablet Take 1 mg by mouth daily. 90 Tab 3       Allergies   Allergen Reactions    Ketorolac Tromethamine Hives    Morphine Unknown (comments)     FAST HEART RATE    Sulfites Cough     SULFITES IS IN PLAVIX       Review of Systems  As per HPI    Objective:  Physical Exam:  Visit Vitals  BP (!) 130/55   Pulse 66   Temp 98 °F (36.7 °C) (Oral)   Resp 18   Wt 105.1 kg (231 lb 9.6 oz)   SpO2 93%   BMI 42.36 kg/m²       General:  Alert, cooperative, no distress, appears stated age. Head:  Normocephalic, without obvious abnormality, atraumatic. Eyes:  Conjunctivae/corneas clear. PERRL, EOMs intact. Throat: Lips, mucosa, and tongue normal.    Neck: Supple, symmetrical, trachea midline, no adenopathy, thyroid: no enlargement/tenderness/nodules   Back:   Symmetric, no curvature. ROM normal. No CVA tenderness. Lungs:   Clear to auscultation bilaterally. Chest wall:  No tenderness or deformity. Heart:  Regular rate and rhythm, S1, S2 normal, no murmur, click, rub or gallop. Breast Exam:  No tenderness, masses, or nipple abnormality. Right surgical site well healed, no evidence of recurrence. Palpable scar tissue   Abdomen:   Soft, non-tender. Bowel sounds normal. No masses,  No organomegaly. Extremities: Extremities normal, atraumatic, no cyanosis or edema. Skin: Skin color, texture, turgor normal. No rashes or lesions. Lymph nodes: Cervical, supraclavicular, and axillary nodes normal.   Neurologic: CNII-XII intact.        Diagnostic Imaging     [facility] [place]):  Results from Hospital Encounter encounter on 09/02/20   [facility] [place] MAMMO BI DX INCL CAD    Narrative EXAM: Bilateral Digital Diagnostic Mammogram, with CAD, including 3-D breast  tomosynthesis    INDICATION: Personal history right breast cancer treated with breast conserving  therapy in 2015    COMPARISON: 7/10/2019, 6/18/2018, 6/14/2017, 6/7/2016    TECHNIQUE: Images were obtained with 2-D full-field digital mammography with  additional 3-D breast tomosynthesis. The 2-D images were viewed with computer  aided detection as an adjunct.    _______________    FINDINGS:    There are scattered areas of fibroglandular density. Right breast postoperative  and posttreatment scar is stable with benign type microcalcifications  surrounding lucent centers, in keeping with fat necrosis. Additional bilateral  typically benign type microcalcifications are stable. No suspicious findings are  present.    _______________      Impression IMPRESSION:    No mammographic evidence of malignancy. Recommend annual screening mammography  and clinical breast examination. A result letter will be sent to the patient. The patient will be notified by the [facility] [employer] reminder system for scheduling  of her annual screening mammogram.    Assessment Category 2: Benign       Lab Results  Lab Results   Component Value Date/Time    WBC 7.9 07/26/2019 02:27 PM    HGB 13.6 07/26/2019 02:27 PM    HCT 43.0 07/26/2019 02:27 PM    PLATELET 804 68/01/4283 02:27 PM    MCV 95.3 07/26/2019 02:27 PM       Lab Results   Component Value Date/Time    Sodium 140 07/26/2019 02:27 PM    Potassium 4.0 07/26/2019 02:27 PM    Chloride 104 07/26/2019 02:27 PM    CO2 33 (H) 07/26/2019 02:27 PM    Anion gap 3 07/26/2019 02:27 PM    Glucose 168 (H) 07/26/2019 02:27 PM    BUN 30 (H) 07/26/2019 02:27 PM    Creatinine 1.17 07/26/2019 02:27 PM    BUN/Creatinine ratio 26 (H) 07/26/2019 02:27 PM    GFR est AA 55 (L) 07/26/2019 02:27 PM    GFR est non-AA 45 (L) 07/26/2019 02:27 PM    Calcium 9.7 07/26/2019 02:27 PM    Alk.  phosphatase 96 07/26/2019 02:27 PM    Protein, total 7.1 07/26/2019 02:27 PM    Albumin 3.6 07/26/2019 02:27 PM    Globulin 3.5 07/26/2019 02:27 PM    A-G Ratio 1.0 07/26/2019 02:27 PM    ALT (SGPT) 22 07/26/2019 02:27 PM       Assessment/Plan:  75 y.o. female  1. Breast cancer: Patient with stage Ia (T1c N0 M0) breast cancer, currently on adjuvant Arimidex, here for follow-up. *Continue Arimidex for 5-10years  *Diet exercise and weight loss program  *Check labs with CBC and CMP  *DEXA scan due in August 2020. Patient will call PCP to follow up on the order for DEXA   *Consider Fosamax or Prolia  *Next mammogram due in Sept 2021    2. Osteopenia:  *Continue Vitamin D  *PCP follows Vitamin D deficiency  *Says she is on Fosamax currently. *Weightbearing exercise    3. Morbid obesity  *Diet exercise and weight loss program    Orders Placed This Encounter    CBC WITH AUTOMATED DIFF     Standing Status:   Future     Standing Expiration Date:   8/01/3095    METABOLIC PANEL, COMPREHENSIVE     Standing Status:   Future     Standing Expiration Date:   9/16/2021    CA 27.29     Standing Status:   Future     Standing Expiration Date:   9/16/2021       Return in 6 months or sooner if indicated  Labs ordered today to be completed this week.        Dr. Roseann Brady  09/15/2020      CC: Jocelyne Hayden MD

## 2020-09-21 ENCOUNTER — OFFICE VISIT (OUTPATIENT)
Dept: SURGERY | Age: 75
End: 2020-09-21

## 2020-09-21 VITALS
TEMPERATURE: 97.4 F | BODY MASS INDEX: 42.88 KG/M2 | RESPIRATION RATE: 20 BRPM | HEART RATE: 68 BPM | WEIGHT: 233 LBS | SYSTOLIC BLOOD PRESSURE: 146 MMHG | HEIGHT: 62 IN | DIASTOLIC BLOOD PRESSURE: 74 MMHG

## 2020-09-21 DIAGNOSIS — C50.511 MALIGNANT NEOPLASM OF LOWER-OUTER QUADRANT OF RIGHT BREAST OF FEMALE, ESTROGEN RECEPTOR POSITIVE (HCC): Primary | ICD-10-CM

## 2020-09-21 DIAGNOSIS — Z17.0 MALIGNANT NEOPLASM OF LOWER-OUTER QUADRANT OF RIGHT BREAST OF FEMALE, ESTROGEN RECEPTOR POSITIVE (HCC): Primary | ICD-10-CM

## 2020-09-21 NOTE — PROGRESS NOTES
Breast Cancer     Ms. Alexandrea Patel is a 75year old woman with right T1cN0 ER/SC positive, HER negative breast cancer s/p partial mastectomy and sentinel node biopsy by Dr. Alonzo Has 11/4/15. She completed partial breast irradiation per Dr. Cordell Paniagua. She is taking Arimidex daily per Dr. Carlyle Castelan without untoward effects. Overall she is doing well and is without complaint related to her breasts. Breast/GYN history:    OB History    No obstetric history on file. Past Medical History:   Diagnosis Date    Asthma     Breast cancer ([facility] [address] 2015    Right lumpectomy    Breast cyst     RT - removal    Depression     Diabetes ([facility] [address]     Hypertension     Menopause     Radiation therapy complication     2 times for 5 days    Thromboembolus [facility])        Past Surgical History:   Procedure Laterality Date    HX BREAST LUMPECTOMY Right 11/4/2015    right breast lumpectomy with localization,sentinel lymph node biopsy,possible complete axillary dissection,insertion of charleen spacer  performed by Sharmila Storng MD at [address] HX CHOLECYSTECTOMY      HX CHOLECYSTECTOMY      HX KNEE REPLACEMENT Bilateral     HX KNEE REPLACEMENT Bilateral        Current Outpatient Medications on File Prior to Visit   Medication Sig Dispense Refill    anastrozole (ARIMIDEX) 1 mg tablet Take 1 mg by mouth daily. 90 Tab 3    Symbicort 160-4.5 mcg/actuation HFAA       olmesartan (BENICAR) 40 mg tablet       alendronate (FOSAMAX) 10 mg tablet       famotidine (PEPCID) 20 mg tablet       FREESTYLE LITE STRIPS strip       predniSONE (DELTASONE) 20 mg tablet       aspirin delayed-release 325 mg tablet Take  by mouth daily.  clobetasol (TEMOVATE) 0.05 % ointment Apply  to affected area as needed for Skin Irritation.  b complex vitamins tablet Take 1 Tab by mouth daily.  buPROPion SR (WELLBUTRIN, ZYBAN) 200 mg SR tablet Take 200 mg by mouth two (2) times a day.       losartan (COZAAR) 100 mg tablet Take 100 mg by mouth daily.  hydroCHLOROthiazide (HYDRODIURIL) 25 mg tablet Take 25 mg by mouth daily.  QUEtiapine (SEROQUEL) 25 mg tablet Take  by mouth.  FLOVENT DISKUS 250 mcg/actuation dsdv       propranolol LA (INDERAL LA) 80 mg SR capsule       ergocalciferol (VITAMIN D2) 50,000 unit capsule Take 50,000 Units by mouth every seven (7) days.  sitaGLIPtin (JANUVIA) 100 mg tablet Take 100 mg by mouth daily.  sertraline (ZOLOFT) 100 mg tablet Take  by mouth daily.  diltiazem hcl 360 mg Tb24 Take  by mouth.  coenzyme q10-vitamin e (COQ10 ) 100-100 mg-unit cap Take  by mouth.  PROPANTHELINE BROMIDE (PROPANTHELINE PO) Take  by mouth.  montelukast (SINGULAIR) 10 mg tablet Take 10 mg by mouth daily.  lovastatin (MEVACOR) 40 mg tablet Take 40 mg by mouth nightly.  zolpidem (AMBIEN) 10 mg tablet Take  by mouth nightly as needed for Sleep.  insulin glargine (LANTUS) 100 unit/mL injection 10 Units by SubCUTAneous route nightly.  repaglinide (PRANDIN) 0.5 mg tablet Take 0.5 mg by mouth Before breakfast, lunch, and dinner.  albuterol (PROVENTIL VENTOLIN) 2.5 mg /3 mL (0.083 %) nebulizer solution by Nebulization route once.  fluticasone (FLONASE) 50 mcg/actuation nasal spray nightly.  raNITIdine (ZANTAC) 150 mg tablet Take 150 mg by mouth two (2) times a day. No current facility-administered medications on file prior to visit.         Allergies   Allergen Reactions    Ketorolac Tromethamine Hives    Morphine Unknown (comments)     FAST HEART RATE    Sulfites Cough     SULFITES IS IN PLAVIX       Social History     Tobacco Use    Smoking status: Never Smoker    Smokeless tobacco: Never Used   Substance Use Topics    Alcohol use: No     Alcohol/week: 0.0 standard drinks    Drug use: No       Family History   Problem Relation Age of Onset    Alcohol abuse Mother     Hypertension Father     Cancer Father          ROS: positives are bolded  General: fevers, chills, night sweats, fatigue, weight loss, weight gain  GI: nausea, vomiting, abdominal pain, change in bowel habits, hematochezia, melena, GERD  Integ: dermatitis, abnormal moles  HEENT: visual changes, vertigo, epistaxis, dysphagia, hoarseness  Cardiac: chest pain, palpitations, HTN, edema, varicosities  Resp: cough, shortness of breath, wheezing, hemoptysis, snoring, reactive airway disease  : hematuria, dysuria, frequency, urgency, nocturia, stress urinary incontinence   MSK: weakness, joint pain, arthritis  Endocrine: diabetes, thyroid disease, polyuria, polydipsia, polyphagia, poor wound healing, heat intolerance, cold intolerance  Lymph/Heme: anemia, bruising, history of blood transfusions  Neuro: dizziness, headache, fainting, seizures, stroke  Psych: anxiety, depression    Physical Exam:  Visit Vitals  BP (!) 146/74   Pulse 68   Temp 97.4 °F (36.3 °C) (Oral)   Resp 20   Ht 5' 2\" (1.575 m)   Wt 105.7 kg (233 lb)   BMI 42.62 kg/m²       Gen:  No distress  Head: normocephalic, atraumatic  Mouth: Clear, no overt lesions, oral mucosa pink and moist  Neck: supple, no masses, no adenopathy, trachea midline  Resp: clear bilaterally  Cardio: Regular rate and rhythm  Abdomen: soft, nontender, nondistended  Extremeties: warm, well-perfused  Neuro: sensation and strength grossly intact and symmetrical  Psych: alert and oriented to person, place and time  Breasts:   Right: Examined in both the supine and upright positions. There was no supraclavicular, infraclavicular, or axillary lympadenopathy. There were no dominant masses, no skin changes, no asymmetry identified well healed surgical scars  Left: Examined in both the supine and upright positions. There was no supraclavicular, infraclavicular, or axillary lympadenopathy. There were no dominant masses, no skin changes, no asymmetry identified       Imagin20 bilateral mammo  No mammographic evidence of malignancy.  Recommend annual screening mammography  and clinical breast examination.     A result letter will be sent to the patient.     The patient will be notified by the [facility] reminder system for scheduling  of her annual screening mammogram.     Assessment Category 2: Benign      7/10/19 bilateral mammo  Unchanged benign-appearing posttreatment findings in the right breast. No  mammographic evidence of malignancy. Recommend annual screening mammography.     A result letter will be sent to the patient.     The patient will be notified by the [facility] reminder system for scheduling  of her annual screening mammogram.     BI-RADS Assessment Category 2: Benign    6/18/18 bilateral mammo  No mammographic evidence of malignancy. Stable postsurgical and postradiation  changes right breast. Recommend annual screening mammography and clinical breast  examination.     Results given to patient at time of examination.     The patient will be notified by the [facility] reminder system for scheduling  of her annual diagnostic mammogram.     BI-RADS Assessment Category 2: Benign finding. 40 NM - Normal>40 Dense     Pathology:  A: LYMPH NODE, RIGHT AXILLA, SENTINEL #1, BIOPSY:  ONE LYMPH NODE WITH NO METASTATIC CARCINOMA (0/1). AN IMMUNOPEROXIDASE STAIN FOR PAN-CYTOKERATIN ANTIGENS IS NEGATIVE. B: LYMPH NODE, RIGHT AXILLA, SENTINEL #2, BIOPSY:  TWO LYMPH NODES WITH NO METASTATIC CARCINOMA (0/2). IMMUNOPEROXIDASE STAINS FOR MUÑOZ-CYTOKERATIN ANTIGENS ARE NEGATIVE. C: BREAST, RIGHT, PARTIAL MASTECTOMY:  INFILTRATING DUCTAL CARCINOMA. SIZE OF THE INVASIVE CARCINOMA: 1.1 CM. HISTOLOGIC GRADE: II OF III. TUBULAR DIFFERENTIATION SCORE: 3.  NUCLEAR PLEOMORPHISM SCORE: 3. MITOTIC RATE SCORE: 1. TOTAL SCORE: 7. FOCALITY: UNIFOCAL. IN SITU CARCINOMA COMPONENT: INTERMEDIATE GRADE DUCTAL  CARCINOMA IN-SITU AND LOBULAR CARCINOMA IN-SITU COMPRISING  LESS THAN 25% OF THE TUMOR. LYMPHOVASCULAR SPACE INVASION: IDENTIFIED.   MARGINS OF RESECTION: NOT INVOLVED. MICROCALCIFICATIONS: IDENTIFIED IN THE TUMOR, NORMAL ACINAR  STRUCTURES AND STROMA  ESTROGEN RECEPTOR: POSITIVE ([id number]). PROGESTERONE RECEPTOR: POSITIVE ([id number]). HER2-gisela (FISH): NEGATIVE ([id number]). AJCC STAGE: pT1c N0. Impression:  Patient Active Problem List   Diagnosis Code    Mass of right breast on mammogram And ultrasound-6:00,  6 cm from nipple N63.10    Asthma, endogenous J45.909    Hypertension, essential I10    Diabetes mellitus type 2, uncomplicated ([facility] [address] J26.8    Breast cancer, right ([facility] [address] C50.911    Obesity, morbid ([facility] [address] E66.01    Osteopenia       75year old woman with right  ER/NM positive, HER negative T1cN0 breast cancer s/p partial mastectomy and sentinel node biopsy by Dr. Teri Wolff 11/4/15. Continue Arimidex daily. I have recommended annual 3D mammogram and clinical breast exam.    She is due for bilateral mammogram September 2021. Follow up after imaging. She was asked to call with any additional questions or concerns.

## 2020-09-21 NOTE — LETTER
9/21/2020 10:46 AM 
 
Patient:  Sukhjinder Huggins YOB: 1945 Date of Visit: 9/21/2020 Jose William MD 
[id number] [facility] [id number] VIA Facsimile: [phone] Dear Jose William MD, 
 
 
I had the pleasure of seeing Ms. Mai Pritchard in my office today for her breast cancer. I am including a copy of my office visit today. If you have questions, please do not hesitate to call me. I look forward to following Ms. Kirk along with you and will keep you updated as to her progress. Sincerely, uJlio Cesar Ann MD

## 2020-09-21 NOTE — PROGRESS NOTES
Patient presents for follow up  right T1cN0 ER/RI positive, HER negative breast cancer s/p partial mastectomy and sentinel node biopsy by Dr. Claudio Hayes 11/4/15. 1. Have you been to the ER, urgent care clinic since your last visit? Hospitalized since your last visit? No    2. Have you seen or consulted any other health care providers outside of the [address] [place] [address] since your last visit? Include any pap smears or colon screening.  No

## 2020-09-22 ENCOUNTER — HOSPITAL ENCOUNTER (OUTPATIENT)
Dept: INFUSION THERAPY | Age: 75
Discharge: HOME | End: 2020-09-22
Attending: NURSE PRACTITIONER
Payer: MEDICARE

## 2020-09-22 DIAGNOSIS — Z17.0 MALIGNANT NEOPLASM OF LOWER-OUTER QUADRANT OF RIGHT BREAST OF FEMALE, ESTROGEN RECEPTOR POSITIVE (HCC): ICD-10-CM

## 2020-09-22 DIAGNOSIS — C50.511 MALIGNANT NEOPLASM OF LOWER-OUTER QUADRANT OF RIGHT BREAST OF FEMALE, ESTROGEN RECEPTOR POSITIVE (HCC): ICD-10-CM

## 2020-09-22 LAB
ALBUMIN SERPL-MCNC: 3.2 G/DL (ref 3.4–5)
ALBUMIN/GLOB SERPL: 0.8 {RATIO} (ref 0.8–1.7)
ALP SERPL-CCNC: 83 U/L (ref 45–117)
ALT SERPL-CCNC: 21 U/L (ref 13–56)
ANION GAP SERPL CALC-SCNC: 5 MMOL/L (ref 3–18)
AST SERPL-CCNC: 15 U/L (ref 10–38)
BASO+EOS+MONOS # BLD AUTO: 0.7 K/UL (ref 0–2.3)
BASO+EOS+MONOS NFR BLD AUTO: 8 % (ref 0.1–17)
BILIRUB SERPL-MCNC: 0.3 MG/DL (ref 0.2–1)
BUN SERPL-MCNC: 27 MG/DL (ref 7–18)
BUN/CREAT SERPL: 19 (ref 12–20)
CALCIUM SERPL-MCNC: 9.4 MG/DL (ref 8.5–10.1)
CHLORIDE SERPL-SCNC: 106 MMOL/L (ref 100–111)
CO2 SERPL-SCNC: 30 MMOL/L (ref 21–32)
CREAT SERPL-MCNC: 1.39 MG/DL (ref 0.6–1.3)
DIFFERENTIAL METHOD BLD: ABNORMAL
ERYTHROCYTE [DISTWIDTH] IN BLOOD BY AUTOMATED COUNT: 14.3 % (ref 11.5–14.5)
GLOBULIN SER CALC-MCNC: 3.9 G/DL (ref 2–4)
GLUCOSE SERPL-MCNC: 213 MG/DL (ref 74–99)
HCT VFR BLD AUTO: 39.3 % (ref 36–48)
HGB BLD-MCNC: 12.6 G/DL (ref 12–16)
LYMPHOCYTES # BLD: 2.1 K/UL (ref 1.1–5.9)
LYMPHOCYTES NFR BLD: 24 % (ref 14–44)
MCH RBC QN AUTO: 31 PG (ref 25–35)
MCHC RBC AUTO-ENTMCNC: 32.1 G/DL (ref 31–37)
MCV RBC AUTO: 96.8 FL (ref 78–102)
NEUTS SEG # BLD: 5.9 K/UL (ref 1.8–9.5)
NEUTS SEG NFR BLD: 68 % (ref 40–70)
PLATELET # BLD AUTO: 210 K/UL (ref 140–440)
POTASSIUM SERPL-SCNC: 3.4 MMOL/L (ref 3.5–5.5)
PROT SERPL-MCNC: 7.1 G/DL (ref 6.4–8.2)
RBC # BLD AUTO: 4.06 M/UL (ref 4.1–5.1)
SODIUM SERPL-SCNC: 141 MMOL/L (ref 136–145)
WBC # BLD AUTO: 8.7 K/UL (ref 4.5–13)

## 2020-09-22 PROCEDURE — 80053 COMPREHEN METABOLIC PANEL: CPT

## 2020-09-22 PROCEDURE — 86300 IMMUNOASSAY TUMOR CA 15-3: CPT

## 2020-09-22 PROCEDURE — 36415 COLL VENOUS BLD VENIPUNCTURE: CPT

## 2020-09-22 PROCEDURE — 85025 COMPLETE CBC W/AUTO DIFF WBC: CPT

## 2020-09-22 NOTE — PROGRESS NOTES
[facility] Progress Note    Date: 2020    Name: Lukasz Garcia    MRN: [id number]         : 1945    Peripheral Lab Draw    Recent Results (from the past 12 hour(s))   CBC WITH 3 PART DIFF    Collection Time: 20  1:15 PM   Result Value Ref Range    WBC 8.7 4.5 - 13.0 K/uL    RBC 4.06 (L) 4.10 - 5.10 M/uL    HGB 12.6 12.0 - 16.0 g/dL    HCT 39.3 36 - 48 %    MCV 96.8 78 - 102 FL    MCH 31.0 25.0 - 35.0 PG    MCHC 32.1 31 - 37 g/dL    RDW 14.3 11.5 - 14.5 %    PLATELET 217 034 - 988 K/uL    NEUTROPHILS 68 40 - 70 %    MIXED CELLS 8 0.1 - 17 %    LYMPHOCYTES 24 14 - 44 %    ABS. NEUTROPHILS 5.9 1.8 - 9.5 K/UL    ABS. MIXED CELLS 0.7 0.0 - 2.3 K/uL    ABS. LYMPHOCYTES 2.1 1.1 - 5.9 K/UL    DF AUTOMATED         Ms. Kirk to [facility], ambulatory at  accompanied by self. Pt was assessed and education was provided. Blood obtained peripherally from left arm x 1 attempt with butterfly needle and sent to lab for Cbc w/diff, Cmp and CA 27.29 per written orders. No bleeding or hematoma noted at site. Gauze and coban applied. Ms. Coreen Mckeon tolerated the phlebotomy, and had no complaints. Patient armband removed and shredded. Ms. Coreen Mckeon was discharged from [place] [address] in stable condition at 1315.      Lisha Deluca Phlebotomist PCT  2020  2:30 PM

## 2020-09-24 LAB — CANCER AG27-29 SERPL-ACNC: 7.4 U/ML (ref 0–38.6)

## 2020-10-01 ENCOUNTER — HOSPITAL ENCOUNTER (OUTPATIENT)
Dept: GENERAL RADIOLOGY | Age: 75
Discharge: HOME | End: 2020-10-01
Attending: NURSE PRACTITIONER
Payer: MEDICARE

## 2020-10-01 DIAGNOSIS — Z78.0 POST-MENOPAUSE: ICD-10-CM

## 2020-10-01 PROCEDURE — 77080 DXA BONE DENSITY AXIAL: CPT

## 2020-10-22 RX ORDER — ALENDRONATE SODIUM 10 MG/1
10 TABLET ORAL
Qty: 90 TAB | Refills: 3 | Status: SHIPPED | OUTPATIENT
Start: 2020-10-22 | End: 2021-09-27

## 2021-03-11 DIAGNOSIS — C50.511 MALIGNANT NEOPLASM OF LOWER-OUTER QUADRANT OF RIGHT BREAST OF FEMALE, ESTROGEN RECEPTOR POSITIVE (HCC): Primary | ICD-10-CM

## 2021-03-11 DIAGNOSIS — Z17.0 MALIGNANT NEOPLASM OF LOWER-OUTER QUADRANT OF RIGHT BREAST OF FEMALE, ESTROGEN RECEPTOR POSITIVE (HCC): Primary | ICD-10-CM

## 2021-03-17 ENCOUNTER — HOSPITAL ENCOUNTER (OUTPATIENT)
Dept: INFUSION THERAPY | Age: 76
Discharge: HOME | End: 2021-03-17
Payer: MEDICARE

## 2021-03-17 VITALS
DIASTOLIC BLOOD PRESSURE: 72 MMHG | TEMPERATURE: 99.1 F | HEART RATE: 71 BPM | OXYGEN SATURATION: 96 % | SYSTOLIC BLOOD PRESSURE: 147 MMHG

## 2021-03-17 DIAGNOSIS — Z17.0 MALIGNANT NEOPLASM OF LOWER-OUTER QUADRANT OF RIGHT BREAST OF FEMALE, ESTROGEN RECEPTOR POSITIVE (HCC): ICD-10-CM

## 2021-03-17 DIAGNOSIS — C50.511 MALIGNANT NEOPLASM OF LOWER-OUTER QUADRANT OF RIGHT BREAST OF FEMALE, ESTROGEN RECEPTOR POSITIVE (HCC): ICD-10-CM

## 2021-03-17 LAB
ALBUMIN SERPL-MCNC: 3.5 G/DL (ref 3.4–5)
ALBUMIN/GLOB SERPL: 1 {RATIO} (ref 0.8–1.7)
ALP SERPL-CCNC: 69 U/L (ref 45–117)
ALT SERPL-CCNC: 28 U/L (ref 13–56)
ANION GAP SERPL CALC-SCNC: 5 MMOL/L (ref 3–18)
AST SERPL-CCNC: 14 U/L (ref 10–38)
BASO+EOS+MONOS # BLD AUTO: 0.6 K/UL (ref 0–2.3)
BASO+EOS+MONOS NFR BLD AUTO: 8 % (ref 0.1–17)
BILIRUB SERPL-MCNC: 0.4 MG/DL (ref 0.2–1)
BUN SERPL-MCNC: 26 MG/DL (ref 7–18)
BUN/CREAT SERPL: 23 (ref 12–20)
CALCIUM SERPL-MCNC: 9.5 MG/DL (ref 8.5–10.1)
CHLORIDE SERPL-SCNC: 106 MMOL/L (ref 100–111)
CO2 SERPL-SCNC: 33 MMOL/L (ref 21–32)
CREAT SERPL-MCNC: 1.13 MG/DL (ref 0.6–1.3)
DIFFERENTIAL METHOD BLD: ABNORMAL
ERYTHROCYTE [DISTWIDTH] IN BLOOD BY AUTOMATED COUNT: 14.3 % (ref 11.5–14.5)
GLOBULIN SER CALC-MCNC: 3.5 G/DL (ref 2–4)
GLUCOSE SERPL-MCNC: 184 MG/DL (ref 74–99)
HCT VFR BLD AUTO: 38.7 % (ref 36–48)
HGB BLD-MCNC: 11.8 G/DL (ref 12–16)
LYMPHOCYTES # BLD: 1.8 K/UL (ref 1.1–5.9)
LYMPHOCYTES NFR BLD: 24 % (ref 14–44)
MCH RBC QN AUTO: 29.6 PG (ref 25–35)
MCHC RBC AUTO-ENTMCNC: 30.5 G/DL (ref 31–37)
MCV RBC AUTO: 97.2 FL (ref 78–102)
NEUTS SEG # BLD: 5 K/UL (ref 1.8–9.5)
NEUTS SEG NFR BLD: 68 % (ref 40–70)
PLATELET # BLD AUTO: 224 K/UL (ref 135–420)
POTASSIUM SERPL-SCNC: 3.6 MMOL/L (ref 3.5–5.5)
PROT SERPL-MCNC: 7 G/DL (ref 6.4–8.2)
RBC # BLD AUTO: 3.98 M/UL (ref 4.1–5.1)
SODIUM SERPL-SCNC: 144 MMOL/L (ref 136–145)
WBC # BLD AUTO: 7.4 K/UL (ref 4.5–13)

## 2021-03-17 PROCEDURE — 80053 COMPREHEN METABOLIC PANEL: CPT

## 2021-03-17 PROCEDURE — 85025 COMPLETE CBC W/AUTO DIFF WBC: CPT

## 2021-03-17 PROCEDURE — 85049 AUTOMATED PLATELET COUNT: CPT

## 2021-03-17 PROCEDURE — 86300 IMMUNOASSAY TUMOR CA 15-3: CPT

## 2021-03-17 NOTE — PROGRESS NOTES
[facility] Progress Note    Date: 2021    Name: Kong Cummings    MRN: [id number]         : 1945    Peripheral Lab Draw      Ms. Kirk to [place], ambulatory at 1106 accompanied by self. Pt was assessed and education was provided. Ms. Nguyễn Slrachel vitals were reviewed and patient was observed for 5 minutes prior to treatment. Visit Vitals  BP (!) 147/72 (BP 1 Location: Right arm, BP Patient Position: Sitting)   Pulse 71   Temp 99.1 °F (37.3 °C)   SpO2 96%     Recent Results (from the past 12 hour(s))   METABOLIC PANEL, COMPREHENSIVE    Collection Time: 21 11:16 AM   Result Value Ref Range    Sodium 144 136 - 145 mmol/L    Potassium 3.6 3.5 - 5.5 mmol/L    Chloride 106 100 - 111 mmol/L    CO2 33 (H) 21 - 32 mmol/L    Anion gap 5 3.0 - 18 mmol/L    Glucose 184 (H) 74 - 99 mg/dL    BUN 26 (H) 7.0 - 18 MG/DL    Creatinine 1.13 0.6 - 1.3 MG/DL    BUN/Creatinine ratio 23 (H) 12 - 20      GFR est AA 57 (L) >60 ml/min/1.73m2    GFR est non-AA 47 (L) >60 ml/min/1.73m2    Calcium 9.5 8.5 - 10.1 MG/DL    Bilirubin, total 0.4 0.2 - 1.0 MG/DL    ALT (SGPT) 28 13 - 56 U/L    AST (SGOT) 14 10 - 38 U/L    Alk. phosphatase 69 45 - 117 U/L    Protein, total 7.0 6.4 - 8.2 g/dL    Albumin 3.5 3.4 - 5.0 g/dL    Globulin 3.5 2.0 - 4.0 g/dL    A-G Ratio 1.0 0.8 - 1.7     CBC WITH 3 PART DIFF    Collection Time: 21 11:16 AM   Result Value Ref Range    WBC 7.4 4.5 - 13.0 K/uL    RBC 3.98 (L) 4.10 - 5.10 M/uL    HGB 11.8 (L) 12.0 - 16.0 g/dL    HCT 38.7 36 - 48 %    MCV 97.2 78 - 102 FL    MCH 29.6 25.0 - 35.0 PG    MCHC 30.5 (L) 31 - 37 g/dL    RDW 14.3 11.5 - 14.5 %    NEUTROPHILS 68 40 - 70 %    MIXED CELLS 8 0.1 - 17 %    LYMPHOCYTES 24 14 - 44 %    ABS. NEUTROPHILS 5.0 1.8 - 9.5 K/UL    ABS. MIXED CELLS 0.6 0.0 - 2.3 K/uL    ABS.  LYMPHOCYTES 1.8 1.1 - 5.9 K/UL    DF AUTOMATED         Blood obtained peripherally from left arm x 1 attempt with butterfly needle and sent to lab for Cbc w/diff, Cmp and Ca 27.29 per written orders. No bleeding or hematoma noted at site. Gauze and coban applied. Ms. Ailyn Siegel tolerated the phlebotomy, and had no complaints. Patient armband removed and shredded. Ms. Ailyn Siegel was discharged from [place] [address] in stable condition at 1116.      Raymondo Galeazzi Phlebotomist PCT   March 17, 2021  2:37 PM

## 2021-03-18 LAB — CANCER AG27-29 SERPL-ACNC: 12.3 U/ML (ref 0–38.6)

## 2021-03-25 ENCOUNTER — VIRTUAL VISIT (OUTPATIENT)
Age: 76
End: 2021-03-25
Payer: MEDICARE

## 2021-03-25 DIAGNOSIS — Z17.0 MALIGNANT NEOPLASM OF LOWER-OUTER QUADRANT OF RIGHT BREAST OF FEMALE, ESTROGEN RECEPTOR POSITIVE (HCC): Primary | ICD-10-CM

## 2021-03-25 DIAGNOSIS — C50.511 MALIGNANT NEOPLASM OF LOWER-OUTER QUADRANT OF RIGHT BREAST OF FEMALE, ESTROGEN RECEPTOR POSITIVE (HCC): Primary | ICD-10-CM

## 2021-03-25 PROCEDURE — 99442 PR PHYS/QHP TELEPHONE EVALUATION 11-20 MIN: CPT | Performed by: INTERNAL MEDICINE

## 2021-03-25 NOTE — PROGRESS NOTES
Kate Reynoso is a 67 y.o. female who was seen by synchronous (real-time) audio-technology on 3/25/2021. PCP: Luba Mcgovern MD    Assessment & Plan:     The complexity of medical decision making for this visit is moderate     1. Breast cancer  --Patient with stage Ia (T1c N0 M0) breast cancer, currently on adjuvant Arimidex. She is tolerating well with no side effects.  --03/18/2021: CA27-29 was normal 12.3U/mL  -- Mammogram on 7/10/2020 was negative, BI-RADS 2. Next mammogram due in July 2021  --Continue Arimidex for 5-10years  --Diet exercise and weight loss program  --Check baseline labs with CBC and CMP  --10/01/2020 DEXA scan showed: Bone mineral density shows evidence of osteopenia in lumbar spine and bilateral femurs. Moderate increased risk of fracture    2. Osteopenia  --10/01/2020 DEXA scan showed: Bone mineral density shows evidence of osteopenia in lumbar spine and bilateral femurs. Moderate increased risk of fracture  --Continue Vitamin D  --Says she is on  Fosamax currently per her PCP. I advised her to continue. --Weightbearing exercise     3. Morbid obesity  --Diet exercise and weight loss program    RTC 6 months or sooner if indicated with repeat labs     I spent at least 15 minutes on this visit with this established patient. [id number]  Subjective:   Ms. Kate Reynoso is a 67 y.o. female who was evaluated via audio-only technology. She was diagnosed with ER/NC(+), Her2(-), Stage IA (T1c N0 M0) breast cancer s/p adjuvant RT, currently on adjuvant anastrozole. Patient states she has been tolerating this well without significant side effects from the medication. She is awake alert oriented  Mammogram done on 7/2020 showed BI-RADS 2 benign. DEXA scan done on 10/01/2020 showed osteopenia. She states she is taking Vit D and Fosamax as recommended by her PCP. Otherwise she denies any fevers, chills, shortness of breath, nausea vomiting or abdominal pain. No lumps and bumps.   No focal neurologic deficit. ECOG performance status 0. Independent with ADLs and IADLs.    DX:Breast cancer     STAGE: Stage IA (T1c N0 M0)     ONCOLOGY HISTORY:   6/18/18 bilateral mammo  No mammographic evidence of malignancy. Stable postsurgical and postradiation changes right breast. Recommend annual screening mammography and clinical breast examination. BI-RADS Assessment Category 2: Benign finding. 36 NM - Normal>40 Dense      8/14/2018: DEXA scan showed osteopenia.     11/04/15:Underwent lumpectomy  A: LYMPH NODE, RIGHT AXILLA, SENTINEL #1, BIOPSY:  ONE LYMPH NODE WITH NO METASTATIC CARCINOMA (0/1). AN IMMUNOPEROXIDASE STAIN FOR PAN-CYTOKERATIN ANTIGENS IS NEGATIVE. B: LYMPH NODE, RIGHT AXILLA, SENTINEL #2, BIOPSY:  TWO LYMPH NODES WITH NO METASTATIC CARCINOMA (0/2). IMMUNOPEROXIDASE STAINS FOR MUÑOZ-CYTOKERATIN ANTIGENS ARE NEGATIVE. C: BREAST, RIGHT, PARTIAL MASTECTOMY:  INFILTRATING DUCTAL CARCINOMA. SIZE OF THE INVASIVE CARCINOMA: 1.1 CM. HISTOLOGIC GRADE: II OF III. TUBULAR DIFFERENTIATION SCORE: 3.  NUCLEAR PLEOMORPHISM SCORE: 3. MITOTIC RATE SCORE: 1. TOTAL SCORE: 7. FOCALITY: UNIFOCAL. IN SITU CARCINOMA COMPONENT: INTERMEDIATE GRADE DUCTAL  CARCINOMA IN-SITU AND LOBULAR CARCINOMA IN-SITU COMPRISING  LESS THAN 25% OF THE TUMOR. LYMPHOVASCULAR SPACE INVASION: IDENTIFIED. MARGINS OF RESECTION: NOT INVOLVED. MICROCALCIFICATIONS: IDENTIFIED IN THE TUMOR, NORMAL ACINAR  STRUCTURES AND STROMA  ESTROGEN RECEPTOR: POSITIVE ([id number]). PROGESTERONE RECEPTOR: POSITIVE ([id number]). HER2-gisela (FISH): NEGATIVE ([id number]). AJCC STAGE: pT1c N0.     7/10/2019: Mammogram showed BI-RADS 2 benign  7/24/19: First medical oncology with me  7/10/20: Mammogram negative-Birads 2        Prior to Admission medications    Medication Sig Start Date End Date Taking? Authorizing Provider   alendronate (FOSAMAX) 10 mg tablet Take 1 Tab by mouth Daily (before breakfast).  10/22/20  Yes Shahida Geiger, LATIA   anastrozole (ARIMIDEX) 1 mg tablet Take 1 mg by mouth daily. 9/15/20  Yes Patricia Flaherty, LATIA   Symbicort 160-4.5 mcg/actuation HFAA  5/18/20  Yes Provider, Historical   olmesartan (BENICAR) 40 mg tablet  6/15/20  Yes Provider, Historical   famotidine (PEPCID) 20 mg tablet  11/6/19  Yes Provider, Historical   FREESTYLE LITE STRIPS strip  11/12/19  Yes Provider, Historical   predniSONE (DELTASONE) 20 mg tablet  1/7/20  Yes Provider, Historical   aspirin delayed-release 325 mg tablet Take  by mouth daily. Yes Provider, Historical   clobetasol (TEMOVATE) 0.05 % ointment Apply  to affected area as needed for Skin Irritation. Yes Provider, Historical   b complex vitamins tablet Take 1 Tab by mouth daily. Yes Provider, Historical   buPROPion SR (WELLBUTRIN, ZYBAN) 200 mg SR tablet Take 200 mg by mouth two (2) times a day. Yes Provider, Historical   losartan (COZAAR) 100 mg tablet Take 100 mg by mouth daily. Yes Provider, Historical   raNITIdine (ZANTAC) 150 mg tablet Take 150 mg by mouth two (2) times a day. Yes Provider, Historical   hydroCHLOROthiazide (HYDRODIURIL) 25 mg tablet Take 25 mg by mouth daily. Yes Provider, Historical   QUEtiapine (SEROQUEL) 25 mg tablet Take  by mouth. Yes Provider, Historical   FLOVENT DISKUS 250 mcg/actuation dsdv  1/5/17  Yes Provider, Historical   propranolol LA (INDERAL LA) 80 mg SR capsule  8/19/16  Yes Provider, Historical   ergocalciferol (VITAMIN D2) 50,000 unit capsule Take 50,000 Units by mouth every seven (7) days. Yes Provider, Historical   sitaGLIPtin (JANUVIA) 100 mg tablet Take 100 mg by mouth daily. Yes Provider, Historical   sertraline (ZOLOFT) 100 mg tablet Take  by mouth daily. Yes Provider, Historical   diltiazem hcl 360 mg Tb24 Take  by mouth. Yes Provider, Historical   coenzyme q10-vitamin e (COQ10 ) 100-100 mg-unit cap Take  by mouth. Yes Provider, Historical   PROPANTHELINE BROMIDE (PROPANTHELINE PO) Take  by mouth.    Yes Provider, Historical   montelukast (SINGULAIR) 10 mg tablet Take 10 mg by mouth daily. Yes Provider, Historical   lovastatin (MEVACOR) 40 mg tablet Take 40 mg by mouth nightly. Yes Provider, Historical   zolpidem (AMBIEN) 10 mg tablet Take  by mouth nightly as needed for Sleep. Yes Provider, Historical   insulin glargine (LANTUS) 100 unit/mL injection 10 Units by SubCUTAneous route nightly. Yes Provider, Historical   repaglinide (PRANDIN) 0.5 mg tablet Take 0.5 mg by mouth Before breakfast, lunch, and dinner. Yes Provider, Historical   albuterol (PROVENTIL VENTOLIN) 2.5 mg /3 mL (0.083 %) nebulizer solution by Nebulization route once. Yes Provider, Historical   fluticasone (FLONASE) 50 mcg/actuation nasal spray nightly. Yes Provider, Historical     Patient Active Problem List   Diagnosis Code    Mass of right breast on mammogram And ultrasound-6:00,  6 cm from nipple N63.10    Asthma, endogenous J45.909    Hypertension, essential I10    Diabetes mellitus type 2, uncomplicated ([facility] [address] P94.2    Breast cancer, right ([facility] [address] C50.911    Obesity, morbid ([facility] [address] E66.01    Osteopenia M85.80     Patient Active Problem List    Diagnosis Date Noted    Osteopenia 01/29/2020    Obesity, morbid ([address] 01/23/2018    Breast cancer, right ([facility] [address] 11/04/2015    Mass of right breast on mammogram And ultrasound-6:00,  6 cm from nipple 10/02/2015    Asthma, endogenous 10/02/2015    Hypertension, essential 10/02/2015    Diabetes mellitus type 2, uncomplicated ([facility] [address] 38/23/6631     Current Outpatient Medications   Medication Sig Dispense Refill    alendronate (FOSAMAX) 10 mg tablet Take 1 Tab by mouth Daily (before breakfast). 90 Tab 3    anastrozole (ARIMIDEX) 1 mg tablet Take 1 mg by mouth daily.  90 Tab 3    Symbicort 160-4.5 mcg/actuation HFAA       olmesartan (BENICAR) 40 mg tablet       famotidine (PEPCID) 20 mg tablet       FREESTYLE LITE STRIPS strip       predniSONE (DELTASONE) 20 mg tablet       aspirin delayed-release 325 mg tablet Take by mouth daily.  clobetasol (TEMOVATE) 0.05 % ointment Apply  to affected area as needed for Skin Irritation.  b complex vitamins tablet Take 1 Tab by mouth daily.  buPROPion SR (WELLBUTRIN, ZYBAN) 200 mg SR tablet Take 200 mg by mouth two (2) times a day.  losartan (COZAAR) 100 mg tablet Take 100 mg by mouth daily.  raNITIdine (ZANTAC) 150 mg tablet Take 150 mg by mouth two (2) times a day.  hydroCHLOROthiazide (HYDRODIURIL) 25 mg tablet Take 25 mg by mouth daily.  QUEtiapine (SEROQUEL) 25 mg tablet Take  by mouth.  FLOVENT DISKUS 250 mcg/actuation dsdv       propranolol LA (INDERAL LA) 80 mg SR capsule       ergocalciferol (VITAMIN D2) 50,000 unit capsule Take 50,000 Units by mouth every seven (7) days.  sitaGLIPtin (JANUVIA) 100 mg tablet Take 100 mg by mouth daily.  sertraline (ZOLOFT) 100 mg tablet Take  by mouth daily.  diltiazem hcl 360 mg Tb24 Take  by mouth.  coenzyme q10-vitamin e (COQ10 ) 100-100 mg-unit cap Take  by mouth.  PROPANTHELINE BROMIDE (PROPANTHELINE PO) Take  by mouth.  montelukast (SINGULAIR) 10 mg tablet Take 10 mg by mouth daily.  lovastatin (MEVACOR) 40 mg tablet Take 40 mg by mouth nightly.  zolpidem (AMBIEN) 10 mg tablet Take  by mouth nightly as needed for Sleep.  insulin glargine (LANTUS) 100 unit/mL injection 10 Units by SubCUTAneous route nightly.  repaglinide (PRANDIN) 0.5 mg tablet Take 0.5 mg by mouth Before breakfast, lunch, and dinner.  albuterol (PROVENTIL VENTOLIN) 2.5 mg /3 mL (0.083 %) nebulizer solution by Nebulization route once.  fluticasone (FLONASE) 50 mcg/actuation nasal spray nightly.        Allergies   Allergen Reactions    Ketorolac Tromethamine Hives    Morphine Unknown (comments)     FAST HEART RATE    Sulfites Cough     SULFITES IS IN PLAVIX     Past Medical History:   Diagnosis Date    Asthma     Breast cancer ([facility] [address] 2015    Right lumpectomy    Breast cyst     RT - removal   • Depression    • Diabetes (HCC)    • Hypertension    • Menopause    • Radiation therapy complication     2 times for 5 days   • Thromboembolus (HCC)      Past Surgical History:   Procedure Laterality Date   • HX BREAST LUMPECTOMY Right 11/4/2015    right breast lumpectomy with localization,sentinel lymph node biopsy,possible complete axillary dissection,insertion of charleen spacer  performed by Salbador Dacosta MD at [facility] [place] [facility]   • HX CHOLECYSTECTOMY     • HX CHOLECYSTECTOMY     • HX KNEE REPLACEMENT Bilateral    • HX KNEE REPLACEMENT Bilateral      Family History   Problem Relation Age of Onset   • Alcohol abuse Mother    • Hypertension Father    • Cancer Father      Social History     Tobacco Use   • Smoking status: Never Smoker   • Smokeless tobacco: Never Used   Substance Use Topics   • Alcohol use: No     Alcohol/week: 0.0 standard drinks       ROS:As per HPi    Objective:      Labs:     Lab Results   Component Value Date/Time    WBC 7.4 03/17/2021 11:16 AM    HGB 11.8 (L) 03/17/2021 11:16 AM    HCT 38.7 03/17/2021 11:16 AM    PLATELET 224 03/17/2021 12:00 PM    MCV 97.2 03/17/2021 11:16 AM     Lab Results   Component Value Date/Time    Sodium 144 03/17/2021 11:16 AM    Potassium 3.6 03/17/2021 11:16 AM    Chloride 106 03/17/2021 11:16 AM    CO2 33 (H) 03/17/2021 11:16 AM    Anion gap 5 03/17/2021 11:16 AM    Glucose 184 (H) 03/17/2021 11:16 AM    BUN 26 (H) 03/17/2021 11:16 AM    Creatinine 1.13 03/17/2021 11:16 AM    BUN/Creatinine ratio 23 (H) 03/17/2021 11:16 AM    GFR est AA 57 (L) 03/17/2021 11:16 AM    GFR est non-AA 47 (L) 03/17/2021 11:16 AM    Calcium 9.5 03/17/2021 11:16 AM    Bilirubin, total 0.4 03/17/2021 11:16 AM    Alk. phosphatase 69 03/17/2021 11:16 AM    Protein, total 7.0 03/17/2021 11:16 AM    Albumin 3.5 03/17/2021 11:16 AM    Globulin 3.5 03/17/2021 11:16 AM    A-G Ratio 1.0 03/17/2021 11:16 AM    ALT (SGPT) 28 03/17/2021 11:16 AM    AST (SGOT) 14 03/17/2021 11:16 AM  We discussed the expected course, resolution and complications of the diagnosis(es) in detail. Medication risks, benefits, costs, interactions, and alternatives were discussed as indicated. I advised her to contact the office if her condition worsens, changes or fails to improve as anticipated. She expressed understanding with the diagnosis(es) and plan. Rojelio Marsh, who was evaluated through a patient-initiated, synchronous (real-time) audio- encounter, and/or her healthcare decision maker, is aware that it is a billable service, with coverage as determined by her insurance carrier. She provided verbal consent to proceed: Yes, and patient identification was verified. It was conducted pursuant to the emergency declaration under the [address] [place] [address] authority and the [employer] General Act. A caregiver was present when appropriate. Ability to conduct physical exam was limited. I was in the office. The patient was at home.       Orders Placed This Encounter    CBC WITH AUTOMATED DIFF     Standing Status:   Future     Standing Expiration Date:   1/65/1973    METABOLIC PANEL, COMPREHENSIVE     Standing Status:   Future     Standing Expiration Date:   3/26/2022         Owen Abdalla MD  03/25/21

## 2021-08-10 ENCOUNTER — TELEPHONE (OUTPATIENT)
Dept: SURGERY | Age: 76
End: 2021-08-10

## 2021-08-10 DIAGNOSIS — Z17.0 MALIGNANT NEOPLASM OF LOWER-OUTER QUADRANT OF RIGHT BREAST OF FEMALE, ESTROGEN RECEPTOR POSITIVE (HCC): Primary | ICD-10-CM

## 2021-08-10 DIAGNOSIS — C50.511 MALIGNANT NEOPLASM OF LOWER-OUTER QUADRANT OF RIGHT BREAST OF FEMALE, ESTROGEN RECEPTOR POSITIVE (HCC): Primary | ICD-10-CM

## 2021-08-10 NOTE — TELEPHONE ENCOUNTER
Ms. Ayush Singh called requesting diagnostic mammogram order.  Hx partial mastectomy with Dr. Darien Alaniz 11/4/2015

## 2021-09-21 ENCOUNTER — HOSPITAL ENCOUNTER (OUTPATIENT)
Dept: LAB | Age: 76
Discharge: HOME | End: 2021-09-21
Payer: MEDICARE

## 2021-09-21 ENCOUNTER — HOSPITAL ENCOUNTER (OUTPATIENT)
Dept: WOMENS IMAGING | Age: 76
Discharge: HOME | End: 2021-09-21
Attending: SURGERY
Payer: MEDICARE

## 2021-09-21 DIAGNOSIS — Z17.0 MALIGNANT NEOPLASM OF LOWER-OUTER QUADRANT OF RIGHT BREAST OF FEMALE, ESTROGEN RECEPTOR POSITIVE (HCC): ICD-10-CM

## 2021-09-21 DIAGNOSIS — C50.511 MALIGNANT NEOPLASM OF LOWER-OUTER QUADRANT OF RIGHT BREAST OF FEMALE, ESTROGEN RECEPTOR POSITIVE (HCC): ICD-10-CM

## 2021-09-21 LAB
ALBUMIN SERPL-MCNC: 3.4 G/DL (ref 3.4–5)
ALBUMIN/GLOB SERPL: 1 {RATIO} (ref 0.8–1.7)
ALP SERPL-CCNC: 108 U/L (ref 45–117)
ALT SERPL-CCNC: 44 U/L (ref 13–56)
ANION GAP SERPL CALC-SCNC: 4 MMOL/L (ref 3–18)
AST SERPL-CCNC: 21 U/L (ref 10–38)
BASOPHILS # BLD: 0.1 K/UL (ref 0–0.1)
BASOPHILS NFR BLD: 1 % (ref 0–2)
BILIRUB SERPL-MCNC: 0.5 MG/DL (ref 0.2–1)
BUN SERPL-MCNC: 22 MG/DL (ref 7–18)
BUN/CREAT SERPL: 19 (ref 12–20)
CALCIUM SERPL-MCNC: 9.1 MG/DL (ref 8.5–10.1)
CHLORIDE SERPL-SCNC: 107 MMOL/L (ref 100–111)
CO2 SERPL-SCNC: 33 MMOL/L (ref 21–32)
CREAT SERPL-MCNC: 1.16 MG/DL (ref 0.6–1.3)
DIFFERENTIAL METHOD BLD: ABNORMAL
EOSINOPHIL # BLD: 0.3 K/UL (ref 0–0.4)
EOSINOPHIL NFR BLD: 3 % (ref 0–5)
ERYTHROCYTE [DISTWIDTH] IN BLOOD BY AUTOMATED COUNT: 14.5 % (ref 11.6–14.5)
GLOBULIN SER CALC-MCNC: 3.4 G/DL (ref 2–4)
GLUCOSE SERPL-MCNC: 117 MG/DL (ref 74–99)
HCT VFR BLD AUTO: 42.8 % (ref 35–45)
HGB BLD-MCNC: 12.8 G/DL (ref 12–16)
LYMPHOCYTES # BLD: 2.1 K/UL (ref 0.9–3.6)
LYMPHOCYTES NFR BLD: 23 % (ref 21–52)
MCH RBC QN AUTO: 28.8 PG (ref 24–34)
MCHC RBC AUTO-ENTMCNC: 29.9 G/DL (ref 31–37)
MCV RBC AUTO: 96.2 FL (ref 78–100)
MONOCYTES # BLD: 0.7 K/UL (ref 0.05–1.2)
MONOCYTES NFR BLD: 8 % (ref 3–10)
NEUTS SEG # BLD: 6 K/UL (ref 1.8–8)
NEUTS SEG NFR BLD: 66 % (ref 40–73)
PLATELET # BLD AUTO: 270 K/UL (ref 135–420)
PMV BLD AUTO: 10.4 FL (ref 9.2–11.8)
POTASSIUM SERPL-SCNC: 3.7 MMOL/L (ref 3.5–5.5)
PROT SERPL-MCNC: 6.8 G/DL (ref 6.4–8.2)
RBC # BLD AUTO: 4.45 M/UL (ref 4.2–5.3)
SODIUM SERPL-SCNC: 144 MMOL/L (ref 136–145)
WBC # BLD AUTO: 9.1 K/UL (ref 4.6–13.2)

## 2021-09-21 PROCEDURE — 36415 COLL VENOUS BLD VENIPUNCTURE: CPT

## 2021-09-21 PROCEDURE — 80053 COMPREHEN METABOLIC PANEL: CPT

## 2021-09-21 PROCEDURE — 77062 BREAST TOMOSYNTHESIS BI: CPT

## 2021-09-21 PROCEDURE — 85025 COMPLETE CBC W/AUTO DIFF WBC: CPT

## 2021-09-24 ENCOUNTER — TELEPHONE (OUTPATIENT)
Dept: SURGERY | Age: 76
End: 2021-09-24

## 2021-09-24 DIAGNOSIS — Z17.0 MALIGNANT NEOPLASM OF LOWER-OUTER QUADRANT OF RIGHT BREAST OF FEMALE, ESTROGEN RECEPTOR POSITIVE (HCC): ICD-10-CM

## 2021-09-24 DIAGNOSIS — C50.511 MALIGNANT NEOPLASM OF LOWER-OUTER QUADRANT OF RIGHT BREAST OF FEMALE, ESTROGEN RECEPTOR POSITIVE (HCC): ICD-10-CM

## 2021-09-24 NOTE — TELEPHONE ENCOUNTER
Attempted to reach patient Sandee Ayon with imaging results per Dr. Travis Nieto. There was no answer, so a non urgent voicemail was left for Sandee Ayon requesting a return call to the office at [phone].     ----- Message from Salud Mei MD sent at 9/21/2021 10:40 AM EDT -----  Please let her know there were no areas of concern seen on imaging.   Thanks     ----- Message -----  From: Arcadio Bright Results In  Sent: 9/21/2021  10:31 AM EDT  To: Salud Mei MD

## 2021-09-27 RX ORDER — ALENDRONATE SODIUM 10 MG/1
TABLET ORAL
Qty: 90 TABLET | Refills: 3 | Status: SHIPPED | OUTPATIENT
Start: 2021-09-27

## 2021-09-27 RX ORDER — ANASTROZOLE 1 MG/1
TABLET ORAL
Qty: 90 TABLET | Refills: 3 | Status: SHIPPED | OUTPATIENT
Start: 2021-09-27 | End: 2022-01-25 | Stop reason: SDUPTHER

## 2021-10-01 ENCOUNTER — VIRTUAL VISIT (OUTPATIENT)
Age: 76
End: 2021-10-01
Payer: MEDICARE

## 2021-10-01 DIAGNOSIS — C50.511 MALIGNANT NEOPLASM OF LOWER-OUTER QUADRANT OF RIGHT BREAST OF FEMALE, ESTROGEN RECEPTOR POSITIVE (HCC): Primary | ICD-10-CM

## 2021-10-01 DIAGNOSIS — Z17.0 MALIGNANT NEOPLASM OF LOWER-OUTER QUADRANT OF RIGHT BREAST OF FEMALE, ESTROGEN RECEPTOR POSITIVE (HCC): Primary | ICD-10-CM

## 2021-10-01 PROCEDURE — 99442 PR PHYS/QHP TELEPHONE EVALUATION 11-20 MIN: CPT | Performed by: INTERNAL MEDICINE

## 2021-10-01 NOTE — PROGRESS NOTES
Amber Singh is a 67 y.o. female who was seen by synchronous (real-time) audio-technology on 10/1/2021. PCP: Ron Fofana MD    Assessment & Plan:     The complexity of medical decision making for this visit is moderate     1. Breast cancer  --Patient with stage Ia (T1c N0 M0) breast cancer, currently on adjuvant Arimidex. She is tolerating well with no side effects. --Reviewed labs done on 9/22/2021 including CA 27-29 which were all essentially normal.  -- Mammogram on 9/21/21 was negative, BI-RADS 2. Next mammogram due in September 2022  --Continue Arimidex for 5 years till August 2022 then check BCI  --Diet exercise and weight loss program  --Check baseline labs with CBC and CMP    2. Osteopenia  --10/01/2020 DEXA scan showed: Bone mineral density shows evidence of osteopenia in lumbar spine and bilateral femurs. Moderate increased risk of fracture  --Continue Vitamin D  --Says she is on  Fosamax currently per her PCP. I advised her to continue. --Weightbearing exercise     3. Morbid obesity  --Diet exercise and weight loss program    RTC 6 months or sooner if indicated with repeat labs     I spent at least 15 minutes on this visit with this established patient. [id number]  Subjective:   Ms. Amber Singh is a 67 y.o. female who was evaluated via audio-only technology. She was diagnosed with ER/KS(+), Her2(-), Stage IA (T1c N0 M0) breast cancer s/p adjuvant RT, currently on adjuvant anastrozole. Patient states she has been tolerating this well without significant side effects from the medication. She is awake alert oriented   DEXA scan done on 10/01/2020 showed osteopenia. She states she is taking Vit D and Fosamax as recommended by her PCP. Otherwise she denies any fevers, chills, shortness of breath, nausea vomiting or abdominal pain. No lumps and bumps. No focal neurologic deficit. ECOG performance status 0. Independent with ADLs and IADLs.      DX:Breast cancer     STAGE: Stage IA (T1c N0 M0)     ONCOLOGY HISTORY:   6/18/18 bilateral mammo  No mammographic evidence of malignancy. Stable postsurgical and postradiation changes right breast. Recommend annual screening mammography and clinical breast examination. BI-RADS Assessment Category 2: Benign finding. 36 NM - Normal>40 Dense      8/14/2018: DEXA scan showed osteopenia.     11/04/15:Underwent lumpectomy  A: LYMPH NODE, RIGHT AXILLA, SENTINEL #1, BIOPSY:  ONE LYMPH NODE WITH NO METASTATIC CARCINOMA (0/1). AN IMMUNOPEROXIDASE STAIN FOR PAN-CYTOKERATIN ANTIGENS IS NEGATIVE. B: LYMPH NODE, RIGHT AXILLA, SENTINEL #2, BIOPSY:  TWO LYMPH NODES WITH NO METASTATIC CARCINOMA (0/2). IMMUNOPEROXIDASE STAINS FOR MUÑOZ-CYTOKERATIN ANTIGENS ARE NEGATIVE. C: BREAST, RIGHT, PARTIAL MASTECTOMY:  INFILTRATING DUCTAL CARCINOMA. SIZE OF THE INVASIVE CARCINOMA: 1.1 CM. HISTOLOGIC GRADE: II OF III. TUBULAR DIFFERENTIATION SCORE: 3.  NUCLEAR PLEOMORPHISM SCORE: 3. MITOTIC RATE SCORE: 1. TOTAL SCORE: 7. FOCALITY: UNIFOCAL. IN SITU CARCINOMA COMPONENT: INTERMEDIATE GRADE DUCTAL  CARCINOMA IN-SITU AND LOBULAR CARCINOMA IN-SITU COMPRISING  LESS THAN 25% OF THE TUMOR. LYMPHOVASCULAR SPACE INVASION: IDENTIFIED. MARGINS OF RESECTION: NOT INVOLVED. MICROCALCIFICATIONS: IDENTIFIED IN THE TUMOR, NORMAL ACINAR  STRUCTURES AND STROMA  ESTROGEN RECEPTOR: POSITIVE ([id number]). PROGESTERONE RECEPTOR: POSITIVE ([id number]). HER2-gisela (FISH): NEGATIVE ([id number]). AJCC STAGE: pT1c N0.     7/10/2019: Mammogram showed BI-RADS 2 benign  7/24/19: First medical oncology with me  7/10/20: Mammogram negative-Birads 2        Prior to Admission medications    Medication Sig Start Date End Date Taking?  Authorizing Provider   alendronate (FOSAMAX) 10 mg tablet TAKE 1 TABLET DAILY BEFORE BREAKFAST 9/27/21   Jayshree Reddy NP   anastrozole (ARIMIDEX) 1 mg tablet TAKE 1 TABLET DAILY 9/27/21   Jayshree Reddy NP   Symbicort 160-4.5 mcg/actuation [facility]  5/18/20   Provider, [place] olmesartan (BENICAR) 40 mg tablet  6/15/20   Provider, Historical   famotidine (PEPCID) 20 mg tablet  11/6/19   Provider, Historical   FREESTYLE LITE STRIPS strip  11/12/19   Provider, Historical   predniSONE (DELTASONE) 20 mg tablet  1/7/20   Provider, Historical   aspirin delayed-release 325 mg tablet Take  by mouth daily. Provider, Historical   clobetasol (TEMOVATE) 0.05 % ointment Apply  to affected area as needed for Skin Irritation. Provider, Historical   b complex vitamins tablet Take 1 Tab by mouth daily. Provider, Historical   buPROPion SR (WELLBUTRIN, ZYBAN) 200 mg SR tablet Take 200 mg by mouth two (2) times a day. Provider, Historical   losartan (COZAAR) 100 mg tablet Take 100 mg by mouth daily. Provider, Historical   raNITIdine (ZANTAC) 150 mg tablet Take 150 mg by mouth two (2) times a day. Provider, Historical   hydroCHLOROthiazide (HYDRODIURIL) 25 mg tablet Take 25 mg by mouth daily. Provider, Historical   QUEtiapine (SEROQUEL) 25 mg tablet Take  by mouth. Provider, Historical   FLOVENT DISKUS 250 mcg/actuation dsdv  1/5/17   Provider, Historical   propranolol LA (INDERAL LA) 80 mg SR capsule  8/19/16   Provider, Historical   ergocalciferol (VITAMIN D2) 50,000 unit capsule Take 50,000 Units by mouth every seven (7) days. Provider, Historical   sitaGLIPtin (JANUVIA) 100 mg tablet Take 100 mg by mouth daily. Provider, Historical   sertraline (ZOLOFT) 100 mg tablet Take  by mouth daily. Provider, Historical   diltiazem hcl 360 mg Tb24 Take  by mouth. Provider, Historical   coenzyme q10-vitamin e (COQ10 ) 100-100 mg-unit cap Take  by mouth. Provider, Historical   PROPANTHELINE BROMIDE (PROPANTHELINE PO) Take  by mouth. Provider, Historical   montelukast (SINGULAIR) 10 mg tablet Take 10 mg by mouth daily. Provider, Historical   lovastatin (MEVACOR) 40 mg tablet Take 40 mg by mouth nightly.     Provider, Historical   zolpidem (AMBIEN) 10 mg tablet Take by mouth nightly as needed for Sleep. Provider, Historical   insulin glargine (LANTUS) 100 unit/mL injection 10 Units by SubCUTAneous route nightly. Provider, Historical   repaglinide (PRANDIN) 0.5 mg tablet Take 0.5 mg by mouth Before breakfast, lunch, and dinner. Provider, Historical   albuterol (PROVENTIL VENTOLIN) 2.5 mg /3 mL (0.083 %) nebulizer solution by Nebulization route once. Provider, Historical   fluticasone (FLONASE) 50 mcg/actuation nasal spray nightly. Provider, Historical     Patient Active Problem List   Diagnosis Code    Mass of right breast on mammogram And ultrasound-6:00,  6 cm from nipple N63.10    Asthma, endogenous J45.909    Hypertension, essential I10    Diabetes mellitus type 2, uncomplicated ([facility] [address] J62.5    [facility] C50.911    Obesity, morbid ([facility] E66.01    Osteopenia M85.80     Patient Active Problem List    Diagnosis Date Noted    Osteopenia 01/29/2020    Obesity, morbid ([address] 01/23/2018    Breast cancer, right ([facility] [address] 11/04/2015    Mass of right breast on mammogram And ultrasound-6:00,  6 cm from nipple 10/02/2015    Asthma, endogenous 10/02/2015    Hypertension, essential 10/02/2015    Diabetes mellitus type 2, uncomplicated ([facility] [address] 18/51/7168     Current Outpatient Medications   Medication Sig Dispense Refill    alendronate (FOSAMAX) 10 mg tablet TAKE 1 TABLET DAILY BEFORE BREAKFAST 90 Tablet 3    anastrozole (ARIMIDEX) 1 mg tablet TAKE 1 TABLET DAILY 90 Tablet 3    Symbicort 160-4.5 mcg/actuation HFAA       olmesartan (BENICAR) 40 mg tablet       famotidine (PEPCID) 20 mg tablet       FREESTYLE LITE STRIPS strip       predniSONE (DELTASONE) 20 mg tablet       aspirin delayed-release 325 mg tablet Take  by mouth daily.  clobetasol (TEMOVATE) 0.05 % ointment Apply  to affected area as needed for Skin Irritation.  b complex vitamins tablet Take 1 Tab by mouth daily.       buPROPion SR (WELLBUTRIN, ZYBAN) 200 mg SR tablet Take 200 mg by mouth two (2) times a day.  losartan (COZAAR) 100 mg tablet Take 100 mg by mouth daily.  raNITIdine (ZANTAC) 150 mg tablet Take 150 mg by mouth two (2) times a day.  hydroCHLOROthiazide (HYDRODIURIL) 25 mg tablet Take 25 mg by mouth daily.  QUEtiapine (SEROQUEL) 25 mg tablet Take  by mouth.  FLOVENT DISKUS 250 mcg/actuation dsdv       propranolol LA (INDERAL LA) 80 mg SR capsule       ergocalciferol (VITAMIN D2) 50,000 unit capsule Take 50,000 Units by mouth every seven (7) days.  sitaGLIPtin (JANUVIA) 100 mg tablet Take 100 mg by mouth daily.  sertraline (ZOLOFT) 100 mg tablet Take  by mouth daily.  diltiazem hcl 360 mg Tb24 Take  by mouth.  coenzyme q10-vitamin e (COQ10 ) 100-100 mg-unit cap Take  by mouth.  PROPANTHELINE BROMIDE (PROPANTHELINE PO) Take  by mouth.  montelukast (SINGULAIR) 10 mg tablet Take 10 mg by mouth daily.  lovastatin (MEVACOR) 40 mg tablet Take 40 mg by mouth nightly.  zolpidem (AMBIEN) 10 mg tablet Take  by mouth nightly as needed for Sleep.  insulin glargine (LANTUS) 100 unit/mL injection 10 Units by SubCUTAneous route nightly.  repaglinide (PRANDIN) 0.5 mg tablet Take 0.5 mg by mouth Before breakfast, lunch, and dinner.  albuterol (PROVENTIL VENTOLIN) 2.5 mg /3 mL (0.083 %) nebulizer solution by Nebulization route once.  fluticasone (FLONASE) 50 mcg/actuation nasal spray nightly.        Allergies   Allergen Reactions    Ketorolac Tromethamine Hives    Morphine Unknown (comments)     FAST HEART RATE    Sulfites Cough     SULFITES IS IN PLAVIX     Past Medical History:   Diagnosis Date    Asthma     Breast cancer ([facility] 2015    Right lumpectomy    Breast cyst     RT - removal    Depression     Diabetes ([facility] [facility]     Hypertension     Menopause     Radiation therapy complication     2 times for 5 days    Thromboembolus [facility])      Past Surgical History:   Procedure Laterality Date  HX BREAST LUMPECTOMY Right 11/4/2015    right breast lumpectomy with localization,sentinel lymph node biopsy,possible complete axillary dissection,insertion of charleen spacer  performed by Robb [facility] at [facility] MAIN OR    HX CHOLECYSTECTOMY      HX CHOLECYSTECTOMY      HX KNEE REPLACEMENT Bilateral     HX KNEE REPLACEMENT Bilateral      Family History   Problem Relation Age of Onset    Alcohol abuse Mother     Hypertension Father     Cancer Father      Social History     Tobacco Use    Smoking status: Never Smoker    Smokeless tobacco: Never Used   Substance Use Topics    Alcohol use: No     Alcohol/week: 0.0 standard drinks       ROS:As per HPi    Objective:      Labs:     Lab Results   Component Value Date/Time    WBC 9.1 09/21/2021 10:40 AM    HGB 12.8 09/21/2021 10:40 AM    HCT 42.8 09/21/2021 10:40 AM    PLATELET 538 75/05/1362 10:40 AM    MCV 96.2 09/21/2021 10:40 AM     Lab Results   Component Value Date/Time    Sodium 144 09/21/2021 10:40 AM    Potassium 3.7 09/21/2021 10:40 AM    Chloride 107 09/21/2021 10:40 AM    CO2 33 (H) 09/21/2021 10:40 AM    Anion gap 4 09/21/2021 10:40 AM    Glucose 117 (H) 09/21/2021 10:40 AM    BUN 22 (H) 09/21/2021 10:40 AM    Creatinine 1.16 09/21/2021 10:40 AM    BUN/Creatinine ratio 19 09/21/2021 10:40 AM    GFR est AA 55 (L) 09/21/2021 10:40 AM    GFR est non-AA 45 (L) 09/21/2021 10:40 AM    Calcium 9.1 09/21/2021 10:40 AM    Bilirubin, total 0.5 09/21/2021 10:40 AM    Alk. phosphatase 108 09/21/2021 10:40 AM    Protein, total 6.8 09/21/2021 10:40 AM    Albumin 3.4 09/21/2021 10:40 AM    Globulin 3.4 09/21/2021 10:40 AM    A-G Ratio 1.0 09/21/2021 10:40 AM    ALT (SGPT) 44 09/21/2021 10:40 AM    AST (SGOT) 21 09/21/2021 10:40 AM       We discussed the expected course, resolution and complications of the diagnosis(es) in detail. Medication risks, benefits, costs, interactions, and alternatives were discussed as indicated.   I advised her to contact the office if her condition worsens, changes or fails to improve as anticipated. She expressed understanding with the diagnosis(es) and plan. Messi Irizarry, who was evaluated through a patient-initiated, synchronous (real-time) audio- encounter, and/or her healthcare decision maker, is aware that it is a billable service, with coverage as determined by her insurance carrier. She provided verbal consent to proceed: Yes, and patient identification was verified. It was conducted pursuant to the emergency declaration under the [address] [place] [address] and the [employer] Act. A caregiver was present when appropriate. Ability to conduct physical exam was limited. I was in the office. The patient was at home. No orders of the defined types were placed in this encounter.         Jr Nichols MD  10/01/21

## 2022-01-25 DIAGNOSIS — C50.511 MALIGNANT NEOPLASM OF LOWER-OUTER QUADRANT OF RIGHT BREAST OF FEMALE, ESTROGEN RECEPTOR POSITIVE (HCC): ICD-10-CM

## 2022-01-25 DIAGNOSIS — Z17.0 MALIGNANT NEOPLASM OF LOWER-OUTER QUADRANT OF RIGHT BREAST OF FEMALE, ESTROGEN RECEPTOR POSITIVE (HCC): ICD-10-CM

## 2022-01-25 RX ORDER — ANASTROZOLE 1 MG/1
TABLET ORAL
Qty: 90 TABLET | Refills: 3 | Status: SHIPPED | OUTPATIENT
Start: 2022-01-25

## 2022-01-25 NOTE — TELEPHONE ENCOUNTER
Pt left a vm requesting refill for:  Requested Prescriptions     Pending Prescriptions Disp Refills    anastrozole (ARIMIDEX) 1 mg tablet 90 Tablet 3     Sig: TAKE 1 TABLET DAILY     Per last clinic note: Continue Arimidex for 5 years till August 2022  Pt is due to RTC on 4/7/22.

## 2022-04-01 ENCOUNTER — TELEPHONE (OUTPATIENT)
Age: 77
End: 2022-04-01

## 2022-04-01 DIAGNOSIS — C50.511 MALIGNANT NEOPLASM OF LOWER-OUTER QUADRANT OF RIGHT BREAST OF FEMALE, ESTROGEN RECEPTOR POSITIVE (HCC): Primary | ICD-10-CM

## 2022-04-01 DIAGNOSIS — Z17.0 MALIGNANT NEOPLASM OF LOWER-OUTER QUADRANT OF RIGHT BREAST OF FEMALE, ESTROGEN RECEPTOR POSITIVE (HCC): Primary | ICD-10-CM
